# Patient Record
Sex: FEMALE | Race: WHITE | Employment: OTHER | URBAN - METROPOLITAN AREA
[De-identification: names, ages, dates, MRNs, and addresses within clinical notes are randomized per-mention and may not be internally consistent; named-entity substitution may affect disease eponyms.]

---

## 2017-01-09 ENCOUNTER — TELEPHONE (OUTPATIENT)
Dept: FAMILY MEDICINE CLINIC | Facility: CLINIC | Age: 67
End: 2017-01-09

## 2017-01-09 DIAGNOSIS — I10 ESSENTIAL HYPERTENSION: ICD-10-CM

## 2017-01-09 DIAGNOSIS — E78.2 MIXED HYPERLIPIDEMIA: ICD-10-CM

## 2017-01-09 DIAGNOSIS — E11.9 TYPE 2 DIABETES MELLITUS WITHOUT COMPLICATION, WITHOUT LONG-TERM CURRENT USE OF INSULIN (HCC): Primary | ICD-10-CM

## 2017-01-09 NOTE — TELEPHONE ENCOUNTER
Pt has appt for 1/26 need labs put in before appt to do a week before Lipid, Comp Met Panel, Micro and Hemoglobin A1C

## 2017-01-20 ENCOUNTER — APPOINTMENT (OUTPATIENT)
Dept: LAB | Facility: HOSPITAL | Age: 67
End: 2017-01-20
Attending: FAMILY MEDICINE
Payer: COMMERCIAL

## 2017-01-20 DIAGNOSIS — E78.2 MIXED HYPERLIPIDEMIA: ICD-10-CM

## 2017-01-20 DIAGNOSIS — E11.9 TYPE 2 DIABETES MELLITUS WITHOUT COMPLICATION, WITHOUT LONG-TERM CURRENT USE OF INSULIN (HCC): ICD-10-CM

## 2017-01-20 LAB
ALBUMIN SERPL-MCNC: 3.9 G/DL (ref 3.5–4.8)
ALP LIVER SERPL-CCNC: 93 U/L (ref 50–130)
ALT SERPL-CCNC: 28 U/L (ref 14–54)
AST SERPL-CCNC: 27 U/L (ref 15–41)
BILIRUB SERPL-MCNC: 0.3 MG/DL (ref 0.1–2)
BUN BLD-MCNC: 18 MG/DL (ref 8–20)
CALCIUM BLD-MCNC: 8.6 MG/DL (ref 8.3–10.3)
CHLORIDE: 104 MMOL/L (ref 101–111)
CHOLEST SMN-MCNC: 158 MG/DL (ref ?–200)
CO2: 28 MMOL/L (ref 22–32)
CREAT BLD-MCNC: 0.83 MG/DL (ref 0.55–1.02)
CREAT UR-SCNC: 54.6 MG/DL
EST. AVERAGE GLUCOSE BLD GHB EST-MCNC: 157 MG/DL (ref 68–126)
GLUCOSE BLD-MCNC: 121 MG/DL (ref 70–99)
HBA1C MFR BLD HPLC: 7.1 % (ref ?–5.7)
HDLC SERPL-MCNC: 43 MG/DL (ref 45–?)
HDLC SERPL: 3.67 {RATIO} (ref ?–4.44)
LDLC SERPL CALC-MCNC: 85 MG/DL (ref ?–130)
M PROTEIN MFR SERPL ELPH: 6.7 G/DL (ref 6.1–8.3)
MICROALBUMIN UR-MCNC: 0.72 MG/DL
MICROALBUMIN/CREAT 24H UR-RTO: 13.2 UG/MG (ref ?–30)
NONHDLC SERPL-MCNC: 115 MG/DL (ref ?–130)
POTASSIUM SERPL-SCNC: 4.1 MMOL/L (ref 3.6–5.1)
SODIUM SERPL-SCNC: 140 MMOL/L (ref 136–144)
TRIGLYCERIDES: 148 MG/DL (ref ?–150)
VLDL: 30 MG/DL (ref 5–40)

## 2017-01-20 PROCEDURE — 80061 LIPID PANEL: CPT

## 2017-01-20 PROCEDURE — 82043 UR ALBUMIN QUANTITATIVE: CPT

## 2017-01-20 PROCEDURE — 80053 COMPREHEN METABOLIC PANEL: CPT

## 2017-01-20 PROCEDURE — 36415 COLL VENOUS BLD VENIPUNCTURE: CPT

## 2017-01-20 PROCEDURE — 82570 ASSAY OF URINE CREATININE: CPT

## 2017-01-20 PROCEDURE — 83036 HEMOGLOBIN GLYCOSYLATED A1C: CPT

## 2017-01-26 ENCOUNTER — OFFICE VISIT (OUTPATIENT)
Dept: FAMILY MEDICINE CLINIC | Facility: CLINIC | Age: 67
End: 2017-01-26

## 2017-01-26 VITALS
RESPIRATION RATE: 16 BRPM | DIASTOLIC BLOOD PRESSURE: 78 MMHG | HEART RATE: 72 BPM | TEMPERATURE: 98 F | WEIGHT: 171 LBS | SYSTOLIC BLOOD PRESSURE: 130 MMHG | BODY MASS INDEX: 28 KG/M2

## 2017-01-26 DIAGNOSIS — E11.9 CONTROLLED TYPE 2 DIABETES MELLITUS WITHOUT COMPLICATION, WITHOUT LONG-TERM CURRENT USE OF INSULIN (HCC): Primary | ICD-10-CM

## 2017-01-26 DIAGNOSIS — E78.2 MIXED HYPERLIPIDEMIA: ICD-10-CM

## 2017-01-26 PROCEDURE — 99214 OFFICE O/P EST MOD 30 MIN: CPT | Performed by: FAMILY MEDICINE

## 2017-01-26 RX ORDER — METFORMIN HYDROCHLORIDE 500 MG/1
TABLET, EXTENDED RELEASE ORAL
Qty: 270 TABLET | Refills: 1 | Status: SHIPPED | OUTPATIENT
Start: 2017-01-26 | End: 2017-03-20

## 2017-01-26 RX ORDER — LISINOPRIL 5 MG/1
5 TABLET ORAL DAILY
Qty: 90 TABLET | Refills: 1 | Status: SHIPPED | OUTPATIENT
Start: 2017-01-26 | End: 2017-07-10

## 2017-01-26 NOTE — PROGRESS NOTES
Eileen Hurst is a 61year old female. HPI:   Patient presents for a medication check for her diabetes, hypertension and mixed hyperlipidemia. Patient reports she is taking her medications as ordered, denies any side effects.   Patient has been checkin well otherwise  SKIN: denies any unusual skin lesions or rashes  RESPIRATORY: denies shortness of breath with exertion  CARDIOVASCULAR: denies chest pain on exertion  GI: denies abdominal pain and denies heartburn  NEURO: denies headaches  EXTREMITIES: den apply Misc; 1 lancet by Finger stick route 2 (two) times daily. Use as directed. Dispense: 1 Box; Refill: 3  - CMP in 6 months; Future  - Hemoglobin A1C in 6 months; Future  - Microalb/Creat Ratio, in 6 months; Future    2.  Mixed hyperlipidemia  Continue

## 2017-03-20 RX ORDER — METFORMIN HYDROCHLORIDE 500 MG/1
TABLET, EXTENDED RELEASE ORAL
Qty: 270 TABLET | Refills: 0 | Status: SHIPPED | OUTPATIENT
Start: 2017-03-20 | End: 2017-07-10

## 2017-07-03 ENCOUNTER — APPOINTMENT (OUTPATIENT)
Dept: LAB | Facility: HOSPITAL | Age: 67
End: 2017-07-03
Attending: FAMILY MEDICINE
Payer: COMMERCIAL

## 2017-07-03 ENCOUNTER — PRIOR ORIGINAL RECORDS (OUTPATIENT)
Dept: OTHER | Age: 67
End: 2017-07-03

## 2017-07-03 DIAGNOSIS — E11.9 CONTROLLED TYPE 2 DIABETES MELLITUS WITHOUT COMPLICATION, WITHOUT LONG-TERM CURRENT USE OF INSULIN (HCC): ICD-10-CM

## 2017-07-03 DIAGNOSIS — E78.2 MIXED HYPERLIPIDEMIA: ICD-10-CM

## 2017-07-03 LAB
ALBUMIN SERPL-MCNC: 3.7 G/DL (ref 3.5–4.8)
ALP LIVER SERPL-CCNC: 93 U/L (ref 50–130)
ALT SERPL-CCNC: 24 U/L (ref 14–54)
AST SERPL-CCNC: 19 U/L (ref 15–41)
BILIRUB SERPL-MCNC: 0.3 MG/DL (ref 0.1–2)
BUN BLD-MCNC: 21 MG/DL (ref 8–20)
CALCIUM BLD-MCNC: 8.7 MG/DL (ref 8.3–10.3)
CHLORIDE: 105 MMOL/L (ref 101–111)
CHOLEST SMN-MCNC: 174 MG/DL (ref ?–200)
CO2: 29 MMOL/L (ref 22–32)
CREAT BLD-MCNC: 0.86 MG/DL (ref 0.55–1.02)
CREAT UR-SCNC: 70.5 MG/DL
EST. AVERAGE GLUCOSE BLD GHB EST-MCNC: 148 MG/DL (ref 68–126)
GLUCOSE BLD-MCNC: 134 MG/DL (ref 70–99)
HBA1C MFR BLD HPLC: 6.8 % (ref ?–5.7)
HDLC SERPL-MCNC: 38 MG/DL (ref 45–?)
HDLC SERPL: 4.58 {RATIO} (ref ?–4.44)
LDLC SERPL CALC-MCNC: 92 MG/DL (ref ?–130)
M PROTEIN MFR SERPL ELPH: 7.1 G/DL (ref 6.1–8.3)
MICROALBUMIN UR-MCNC: 1.05 MG/DL
MICROALBUMIN/CREAT 24H UR-RTO: 14.9 UG/MG (ref ?–30)
NONHDLC SERPL-MCNC: 136 MG/DL (ref ?–130)
POTASSIUM SERPL-SCNC: 4.7 MMOL/L (ref 3.6–5.1)
SODIUM SERPL-SCNC: 138 MMOL/L (ref 136–144)
TRIGLYCERIDES: 222 MG/DL (ref ?–150)
VLDL: 44 MG/DL (ref 5–40)

## 2017-07-03 PROCEDURE — 82043 UR ALBUMIN QUANTITATIVE: CPT

## 2017-07-03 PROCEDURE — 82570 ASSAY OF URINE CREATININE: CPT

## 2017-07-03 PROCEDURE — 83036 HEMOGLOBIN GLYCOSYLATED A1C: CPT

## 2017-07-03 PROCEDURE — 36415 COLL VENOUS BLD VENIPUNCTURE: CPT

## 2017-07-03 PROCEDURE — 80053 COMPREHEN METABOLIC PANEL: CPT

## 2017-07-03 PROCEDURE — 80061 LIPID PANEL: CPT

## 2017-07-10 ENCOUNTER — OFFICE VISIT (OUTPATIENT)
Dept: FAMILY MEDICINE CLINIC | Facility: CLINIC | Age: 67
End: 2017-07-10

## 2017-07-10 VITALS
HEART RATE: 76 BPM | RESPIRATION RATE: 16 BRPM | OXYGEN SATURATION: 99 % | TEMPERATURE: 98 F | DIASTOLIC BLOOD PRESSURE: 70 MMHG | WEIGHT: 166 LBS | BODY MASS INDEX: 28 KG/M2 | SYSTOLIC BLOOD PRESSURE: 136 MMHG

## 2017-07-10 DIAGNOSIS — R01.1 HEART MURMUR: Primary | ICD-10-CM

## 2017-07-10 DIAGNOSIS — E11.9 CONTROLLED TYPE 2 DIABETES MELLITUS WITHOUT COMPLICATION, WITHOUT LONG-TERM CURRENT USE OF INSULIN (HCC): ICD-10-CM

## 2017-07-10 DIAGNOSIS — E78.2 MIXED HYPERLIPIDEMIA: ICD-10-CM

## 2017-07-10 PROCEDURE — 99214 OFFICE O/P EST MOD 30 MIN: CPT | Performed by: FAMILY MEDICINE

## 2017-07-10 RX ORDER — LISINOPRIL 5 MG/1
5 TABLET ORAL DAILY
Qty: 90 TABLET | Refills: 1 | Status: SHIPPED | OUTPATIENT
Start: 2017-07-10 | End: 2017-12-15

## 2017-07-10 RX ORDER — CRANBERRY FRUIT EXTRACT 250 MG
CAPSULE ORAL
COMMUNITY

## 2017-07-10 RX ORDER — METFORMIN HYDROCHLORIDE 500 MG/1
TABLET, EXTENDED RELEASE ORAL
Qty: 270 TABLET | Refills: 0 | Status: SHIPPED | OUTPATIENT
Start: 2017-07-10 | End: 2017-10-16

## 2017-07-10 NOTE — PROGRESS NOTES
Saad Johnson is a 61year old female. HPI:   Patient presents for a medication check for her diabetes, hypertension and mixed hyperlipidemia. Patient reports she is taking her medications as ordered, denies any side effects.   Patient has been checkin Smokeless tobacco: Never Used                      Alcohol use: Yes           0.5 oz/week     Standard drinks or equivalent: 1 per week     Comment: 1 glass wine/wk       REVIEW OF SYSTEMS:   GENERAL HEALTH: feels well o TABLETS BY MOUTH EVERY MORNING AND 1 TABLETS BY MOUTH EVERY EVENING      lisinopril 5 MG Oral Tab 90 tablet 1      Sig: Take 1 tablet (5 mg total) by mouth daily.       Glucose Blood (OLEG CONTOUR NEXT TEST) In Vitro Strip 200 strip 3      Sig: TEST THREE

## 2017-08-10 ENCOUNTER — HOSPITAL ENCOUNTER (OUTPATIENT)
Dept: CV DIAGNOSTICS | Facility: HOSPITAL | Age: 67
Discharge: HOME | End: 2017-08-10
Attending: FAMILY MEDICINE
Payer: MEDICARE

## 2017-08-10 DIAGNOSIS — R01.1 HEART MURMUR: ICD-10-CM

## 2017-08-10 PROCEDURE — 93306 TTE W/DOPPLER COMPLETE: CPT | Performed by: FAMILY MEDICINE

## 2017-08-14 ENCOUNTER — MYAURORA ACCOUNT LINK (OUTPATIENT)
Dept: OTHER | Age: 67
End: 2017-08-14

## 2017-08-14 ENCOUNTER — PRIOR ORIGINAL RECORDS (OUTPATIENT)
Dept: OTHER | Age: 67
End: 2017-08-14

## 2017-08-21 LAB
ALBUMIN: 3.7 G/DL
ALKALINE PHOSPHATATE(ALK PHOS): 93 IU/L
BILIRUBIN TOTAL: 0.3 MG/DL
BUN: 21 MG/DL
CALCIUM: 8.7 MG/DL
CHLORIDE: 105 MEQ/L
CHOLESTEROL, TOTAL: 174 MG/DL
CREATININE, SERUM: 0.86 MG/DL
GLUCOSE: 134 MG/DL
HDL CHOLESTEROL: 38 MG/DL
HEMOGLOBIN A1C: 6.8 %
LDL CHOLESTEROL: 92 MG/DL
POTASSIUM, SERUM: 4.7 MEQ/L
PROTEIN, TOTAL: 7.1 G/DL
SGOT (AST): 19 IU/L
SGPT (ALT): 24 IU/L
SODIUM: 138 MEQ/L
TRIGLYCERIDES: 222 MG/DL

## 2017-08-23 ENCOUNTER — OFFICE VISIT (OUTPATIENT)
Dept: FAMILY MEDICINE CLINIC | Facility: CLINIC | Age: 67
End: 2017-08-23

## 2017-08-23 VITALS
OXYGEN SATURATION: 99 % | TEMPERATURE: 99 F | SYSTOLIC BLOOD PRESSURE: 130 MMHG | BODY MASS INDEX: 27.49 KG/M2 | DIASTOLIC BLOOD PRESSURE: 66 MMHG | HEART RATE: 86 BPM | WEIGHT: 165 LBS | RESPIRATION RATE: 16 BRPM | HEIGHT: 65 IN

## 2017-08-23 DIAGNOSIS — R39.9 UTI SYMPTOMS: Primary | ICD-10-CM

## 2017-08-23 LAB
GLUCOSE (URINE DIPSTICK): 100 MG/DL
MULTISTIX LOT#: ABNORMAL NUMERIC
PH, URINE: 7 (ref 4.5–8)
PROTEIN (URINE DIPSTICK): 300 MG/DL
SPECIFIC GRAVITY: 1.02 (ref 1–1.03)
UROBILINOGEN,SEMI-QN: 2 MG/DL (ref 0–1.9)

## 2017-08-23 PROCEDURE — 81003 URINALYSIS AUTO W/O SCOPE: CPT | Performed by: NURSE PRACTITIONER

## 2017-08-23 PROCEDURE — 99213 OFFICE O/P EST LOW 20 MIN: CPT | Performed by: NURSE PRACTITIONER

## 2017-08-23 PROCEDURE — 87086 URINE CULTURE/COLONY COUNT: CPT | Performed by: NURSE PRACTITIONER

## 2017-08-23 PROCEDURE — 87186 SC STD MICRODIL/AGAR DIL: CPT | Performed by: NURSE PRACTITIONER

## 2017-08-23 PROCEDURE — 87088 URINE BACTERIA CULTURE: CPT | Performed by: NURSE PRACTITIONER

## 2017-08-23 RX ORDER — PHENAZOPYRIDINE HYDROCHLORIDE 200 MG/1
200 TABLET, FILM COATED ORAL 3 TIMES DAILY PRN
Qty: 9 TABLET | Refills: 0 | Status: SHIPPED | OUTPATIENT
Start: 2017-08-23 | End: 2018-08-22 | Stop reason: ALTCHOICE

## 2017-08-23 RX ORDER — SULFAMETHOXAZOLE AND TRIMETHOPRIM 800; 160 MG/1; MG/1
1 TABLET ORAL 2 TIMES DAILY
Qty: 14 TABLET | Refills: 0 | Status: SHIPPED | OUTPATIENT
Start: 2017-08-23 | End: 2017-08-30

## 2017-08-23 NOTE — PROGRESS NOTES
Tang King is a 74year old female. HPI:   Patient presents with symptoms of UTI for 1-2 days. Complaining of urinary frequency, urgency, dysuria,  Denies back pain, fever, hematuria.   Pt has strong history of UTI in past.  Pt denies any new sexua GENERAL HEALTH: no  fever/chills or fatigue  SKIN: denies any unusual skin lesions or rashes  RESPIRATORY: no shortness of breath with exertion  CARDIOVASCULAR: denies chest pain on exertion and palpitations. GI: no nausea or vomiting  : as above.   NEUR -     Phenazopyridine HCl 200 MG Oral Tab; Take 1 tablet (200 mg total) by mouth 3 (three) times daily as needed for Pain. Patient Instructions     Bladder Infection, Female (Adult)    Urine is normally doesn't have any bacteria in it.  But bacteria The most common cause of bladder infections is bacteria from the bowels. The bacteria get onto the skin around the opening of the urethra. From there, they can get into the urine and travel up to the bladder, causing inflammation and infection.  This usuall · Urinate more often. Don't try to hold urine in for a long time. · Wear loose-fitting clothes and cotton underwear. Avoid tight-fitting pants. · Improve your diet and prevent constipation.  Eat more fresh fruit and vegetables, and fiber, and less junk an

## 2017-08-25 NOTE — PROGRESS NOTES
Called pt to notify of lab results. Detailed msg left per pt consent.  Pt to call back or follow-up if symptoms increase or persist.

## 2017-10-10 ENCOUNTER — RESEARCH ENCOUNTER (OUTPATIENT)
Dept: HEMATOLOGY/ONCOLOGY | Facility: HOSPITAL | Age: 67
End: 2017-10-10

## 2017-10-10 NOTE — PROGRESS NOTES
LVM for patient to return phone call for continued clinical trial follow up. Research RN contact information provided.

## 2017-10-16 DIAGNOSIS — E11.9 CONTROLLED TYPE 2 DIABETES MELLITUS WITHOUT COMPLICATION, WITHOUT LONG-TERM CURRENT USE OF INSULIN (HCC): ICD-10-CM

## 2017-10-16 RX ORDER — METFORMIN HYDROCHLORIDE 500 MG/1
TABLET, EXTENDED RELEASE ORAL
Qty: 270 TABLET | Refills: 0 | Status: SHIPPED | OUTPATIENT
Start: 2017-10-16 | End: 2017-12-15

## 2017-11-02 ENCOUNTER — RESEARCH ENCOUNTER (OUTPATIENT)
Dept: HEMATOLOGY/ONCOLOGY | Facility: HOSPITAL | Age: 67
End: 2017-11-02

## 2017-11-02 NOTE — PROGRESS NOTES
Patient was sent certified letter on [id number]/84/4198 regarding continued enrollment in clinic trial.    Received notification, dated 10/25/2017 by patient that she would like to withdrawal her consent for study participation.  Patient will be withdrawn from stud

## 2017-11-06 ENCOUNTER — TELEPHONE (OUTPATIENT)
Dept: FAMILY MEDICINE CLINIC | Facility: CLINIC | Age: 67
End: 2017-11-06

## 2017-11-06 DIAGNOSIS — E11.9 TYPE 2 DIABETES MELLITUS WITHOUT COMPLICATION, WITHOUT LONG-TERM CURRENT USE OF INSULIN (HCC): Primary | ICD-10-CM

## 2017-11-06 NOTE — TELEPHONE ENCOUNTER
Pt called she made lab appt for blood draw but this was missing MICROALB/CREAT RATIO, RANDOM URINE  she states she gets this done every 6 months her appt is for Dec please add

## 2017-12-08 ENCOUNTER — APPOINTMENT (OUTPATIENT)
Dept: LAB | Facility: HOSPITAL | Age: 67
End: 2017-12-08
Attending: FAMILY MEDICINE
Payer: MEDICARE

## 2017-12-08 DIAGNOSIS — E11.9 TYPE 2 DIABETES MELLITUS WITHOUT COMPLICATION, WITHOUT LONG-TERM CURRENT USE OF INSULIN (HCC): ICD-10-CM

## 2017-12-08 DIAGNOSIS — E78.2 MIXED HYPERLIPIDEMIA: ICD-10-CM

## 2017-12-08 DIAGNOSIS — E11.9 CONTROLLED TYPE 2 DIABETES MELLITUS WITHOUT COMPLICATION, WITHOUT LONG-TERM CURRENT USE OF INSULIN (HCC): ICD-10-CM

## 2017-12-08 PROCEDURE — 82570 ASSAY OF URINE CREATININE: CPT

## 2017-12-08 PROCEDURE — 80061 LIPID PANEL: CPT

## 2017-12-08 PROCEDURE — 82043 UR ALBUMIN QUANTITATIVE: CPT

## 2017-12-08 PROCEDURE — 80053 COMPREHEN METABOLIC PANEL: CPT

## 2017-12-08 PROCEDURE — 83036 HEMOGLOBIN GLYCOSYLATED A1C: CPT

## 2017-12-08 PROCEDURE — 36415 COLL VENOUS BLD VENIPUNCTURE: CPT

## 2017-12-15 ENCOUNTER — OFFICE VISIT (OUTPATIENT)
Dept: FAMILY MEDICINE CLINIC | Facility: CLINIC | Age: 67
End: 2017-12-15

## 2017-12-15 VITALS
DIASTOLIC BLOOD PRESSURE: 80 MMHG | TEMPERATURE: 98 F | SYSTOLIC BLOOD PRESSURE: 142 MMHG | HEART RATE: 72 BPM | WEIGHT: 164 LBS | RESPIRATION RATE: 16 BRPM | HEIGHT: 65 IN | BODY MASS INDEX: 27.32 KG/M2

## 2017-12-15 DIAGNOSIS — I10 ESSENTIAL HYPERTENSION: Primary | ICD-10-CM

## 2017-12-15 DIAGNOSIS — E11.9 CONTROLLED TYPE 2 DIABETES MELLITUS WITHOUT COMPLICATION, WITHOUT LONG-TERM CURRENT USE OF INSULIN (HCC): ICD-10-CM

## 2017-12-15 DIAGNOSIS — E78.2 MIXED HYPERLIPIDEMIA: ICD-10-CM

## 2017-12-15 PROCEDURE — 99214 OFFICE O/P EST MOD 30 MIN: CPT | Performed by: FAMILY MEDICINE

## 2017-12-15 NOTE — PROGRESS NOTES
Tanika Erickson is a 74year old female. HPI:   Patient presents for a medication check for her diabetes, hypertension and mixed hyperlipidemia. Patient reports she is taking her medications as ordered, denies any side effects.   Patient has been taking mammogram 7/31/12    normal   • Leukocytoclastic vasculitis ([facility] [address]       Social History:  Smoking status: Never Smoker                                                              Smokeless tobacco: Never Used                      Alcohol use:  Yes HCl  MG Oral Tablet 24 Hr 270 tablet 1      Sig: TAKE 2 TABLETS BY MOUTH EVERY MORNING AND 1 TABLET EVERY EVENING      lisinopril 5 MG Oral Tab 90 tablet 1      Sig: Take 1 tablet (5 mg total) by mouth daily.            Imaging & Consults:  None    Fo

## 2017-12-19 ENCOUNTER — TELEPHONE (OUTPATIENT)
Dept: FAMILY MEDICINE CLINIC | Facility: CLINIC | Age: 67
End: 2017-12-19

## 2017-12-19 DIAGNOSIS — E11.00 TYPE 2 DIABETES MELLITUS WITH HYPEROSMOLARITY WITHOUT COMA, WITHOUT LONG-TERM CURRENT USE OF INSULIN (HCC): Primary | ICD-10-CM

## 2017-12-19 RX ORDER — METFORMIN HYDROCHLORIDE 500 MG/1
TABLET, EXTENDED RELEASE ORAL
Qty: 270 TABLET | Refills: 1 | Status: SHIPPED | OUTPATIENT
Start: 2017-12-19 | End: 2018-06-08

## 2017-12-19 RX ORDER — LISINOPRIL 5 MG/1
5 TABLET ORAL DAILY
Qty: 90 TABLET | Refills: 1 | Status: SHIPPED | OUTPATIENT
Start: 2017-12-19 | End: 2018-06-08

## 2018-03-16 ENCOUNTER — OFFICE VISIT (OUTPATIENT)
Dept: FAMILY MEDICINE CLINIC | Facility: CLINIC | Age: 68
End: 2018-03-16

## 2018-03-16 VITALS
BODY MASS INDEX: 28.49 KG/M2 | TEMPERATURE: 98 F | WEIGHT: 171 LBS | HEIGHT: 65 IN | RESPIRATION RATE: 12 BRPM | HEART RATE: 80 BPM | DIASTOLIC BLOOD PRESSURE: 74 MMHG | SYSTOLIC BLOOD PRESSURE: 134 MMHG

## 2018-03-16 DIAGNOSIS — Z13.89 SCREENING FOR GENITOURINARY CONDITION: ICD-10-CM

## 2018-03-16 DIAGNOSIS — C50.912 MALIGNANT NEOPLASM OF LEFT FEMALE BREAST, UNSPECIFIED ESTROGEN RECEPTOR STATUS, UNSPECIFIED SITE OF BREAST (HCC): ICD-10-CM

## 2018-03-16 DIAGNOSIS — Z23 NEED FOR VACCINATION: ICD-10-CM

## 2018-03-16 DIAGNOSIS — Z12.4 ENCOUNTER FOR SCREENING FOR CERVICAL CANCER: Primary | ICD-10-CM

## 2018-03-16 DIAGNOSIS — E11.9 CONTROLLED TYPE 2 DIABETES MELLITUS WITHOUT COMPLICATION, WITHOUT LONG-TERM CURRENT USE OF INSULIN (HCC): ICD-10-CM

## 2018-03-16 DIAGNOSIS — Z00.00 ENCOUNTER FOR ANNUAL HEALTH EXAMINATION: ICD-10-CM

## 2018-03-16 DIAGNOSIS — L20.84 INTRINSIC ECZEMA: ICD-10-CM

## 2018-03-16 LAB
APPEARANCE: CLEAR
MULTISTIX LOT#: NORMAL NUMERIC
PH, URINE: 5.5 (ref 4.5–8)
SPECIFIC GRAVITY: 1 (ref 1–1.03)
URINE-COLOR: YELLOW
UROBILINOGEN,SEMI-QN: 0.2 MG/DL (ref 0–1.9)

## 2018-03-16 PROCEDURE — 88175 CYTOPATH C/V AUTO FLUID REDO: CPT | Performed by: FAMILY MEDICINE

## 2018-03-16 PROCEDURE — G0402 INITIAL PREVENTIVE EXAM: HCPCS | Performed by: FAMILY MEDICINE

## 2018-03-16 PROCEDURE — 90670 PCV13 VACCINE IM: CPT | Performed by: FAMILY MEDICINE

## 2018-03-16 PROCEDURE — 96160 PT-FOCUSED HLTH RISK ASSMT: CPT | Performed by: FAMILY MEDICINE

## 2018-03-16 PROCEDURE — G0009 ADMIN PNEUMOCOCCAL VACCINE: HCPCS | Performed by: FAMILY MEDICINE

## 2018-03-16 PROCEDURE — 81003 URINALYSIS AUTO W/O SCOPE: CPT | Performed by: FAMILY MEDICINE

## 2018-03-16 RX ORDER — CLOBETASOL PROPIONATE 0.46 MG/ML
SOLUTION TOPICAL
Qty: 50 ML | Refills: 1 | Status: SHIPPED | OUTPATIENT
Start: 2018-03-16 | End: 2018-03-16

## 2018-03-16 RX ORDER — CLOBETASOL PROPIONATE 0.46 MG/ML
SOLUTION TOPICAL
Qty: 50 ML | Refills: 1 | Status: SHIPPED | OUTPATIENT
Start: 2018-03-16

## 2018-03-16 NOTE — PROGRESS NOTES
HPI:   Corrinne Farrier is a 74year old female who presents for a MA (Medicare Advantage) Supervisit (Once per calendar year). Has been well. No new complaints  Hx of lest breast cancer with one node involvement. Dx'd in 2002. Walks for exercise. hypertension     Intrinsic eczema    Wt Readings from Last 3 Encounters:  03/16/18 : 171 lb  12/15/17 : 164 lb  08/23/17 : 165 lb     Last Cholesterol Labs:     Lab Results  Component Value Date   CHOLEST 179 12/08/2017   HDL 49 12/08/2017   LDL 94 12/08/2 includes Breast Cancer (age of onset: 50) in her self; Cancer (age of onset: 46) in her father; Ovarian Cancer (age of onset: 55) in her sister. SOCIAL HISTORY:   She  reports that she has never smoked.  She has never used smokeless tobacco. She reports t normal, septum midline,mucosa normal, no drainage or sinus tenderness   Throat: Lips, mucosa, and tongue normal; teeth and gums normal   Neck: Supple, symmetrical, trachea midline, no adenopathy;  thyroid: not enlarged, symmetric, no tenderness/mass/nodule AFFECTED AREA(S) NIGHTLY    Screening for genitourinary condition  -     URINALYSIS, AUTO, W/O SCOPE       Diet assessment: good     PLAN:  The patient indicates understanding of these issues and agrees to the plan.   Reinforced healthy diet, lifestyle, and 67 Data entered on: 12/4/2017   Last Dilated Eye Exam 12/4/2017       Bone Density Screening      Dexascan Every two years Last Dexa Scan:   XR DEXA BONE DENSITOMETRY (CPT=77080) 09/12/2014    No flowsheet data found.     Pap and Pelvic      Pap: Every 3 yr (mmol/L)   Date Value   12/08/2017 4.1     POTASSIUM (mmol/L)   Date Value   12/23/2015 4.5    No flowsheet data found.     Creatinine  Annually CREATININE (mg/dL)   Date Value   12/23/2015 0.92     Creatinine (mg/dL)   Date Value   12/08/2017 0.86    No fl

## 2018-03-16 NOTE — PATIENT INSTRUCTIONS
Rosario Robb's SCREENING SCHEDULE   Tests on this list are recommended by your physician but may not be covered, or covered at this frequency, by your insurer. Please check with your insurance carrier before scheduling to verify coverage.    JOSEF IPPE only No results found for this or any previous visit.  Limited to patients who meet one of the following criteria:   • Men who are 73-68 years old and have smoked more than 100 cigarettes in their lifetime   • Anyone with a family history    Colorectal 74, and as needed after 75 Mammogram,1 Yr due on 10/08/2017 Please get this Mammogram regularly   Immunizations      Influenza  Covered Annually No orders found for this or any previous visit.  Please get every year    Pneumococcal 13 (Prevnar)  Covered Onc home computer and printer. (the forms are also available in [place]  [email or web address]  This link also has information from the [facility] [place] 2[facility] regarding Advance Directives.     Rosario Robb's SCREENING SCHEDULE   Tests on this list medical reasons Electrocardiogram date Routine EKG is not a screening covered service except at the [place] to Medicare Visit    Abdominal aortic aneurysm screening (once between ages 73-68) IPPE only No results found for this or any previous visit.  Limite display for this patient. Chlamydia  Annually if high risk No results found for: CHLAMYDIA No flowsheet data found.     Screening Mammogram      Mammogram    Recommend Annually to at least age 76, and as needed after 76 Mammogram,1 Yr due on 10/08/2017 has a lot of good information including definitions of the different types of Advance Directives.  It also has the State forms available on it's website for anyone to review and print using their home computer and printer. (the forms are also available in [place]

## 2018-05-25 ENCOUNTER — HOSPITAL ENCOUNTER (OUTPATIENT)
Dept: MAMMOGRAPHY | Facility: HOSPITAL | Age: 68
Discharge: HOME | End: 2018-05-25
Attending: FAMILY MEDICINE
Payer: MEDICARE

## 2018-05-25 DIAGNOSIS — C50.912 MALIGNANT NEOPLASM OF LEFT FEMALE BREAST, UNSPECIFIED ESTROGEN RECEPTOR STATUS, UNSPECIFIED SITE OF BREAST (HCC): ICD-10-CM

## 2018-05-25 PROCEDURE — 77062 BREAST TOMOSYNTHESIS BI: CPT | Performed by: FAMILY MEDICINE

## 2018-05-25 PROCEDURE — 77066 DX MAMMO INCL CAD BI: CPT | Performed by: FAMILY MEDICINE

## 2018-06-01 ENCOUNTER — APPOINTMENT (OUTPATIENT)
Dept: LAB | Facility: HOSPITAL | Age: 68
End: 2018-06-01
Attending: FAMILY MEDICINE
Payer: MEDICARE

## 2018-06-01 DIAGNOSIS — E78.2 MIXED HYPERLIPIDEMIA: ICD-10-CM

## 2018-06-01 DIAGNOSIS — E11.00 TYPE 2 DIABETES MELLITUS WITH HYPEROSMOLARITY WITHOUT COMA, WITHOUT LONG-TERM CURRENT USE OF INSULIN (HCC): ICD-10-CM

## 2018-06-01 DIAGNOSIS — E11.9 CONTROLLED TYPE 2 DIABETES MELLITUS WITHOUT COMPLICATION, WITHOUT LONG-TERM CURRENT USE OF INSULIN (HCC): ICD-10-CM

## 2018-06-01 DIAGNOSIS — I10 ESSENTIAL HYPERTENSION: ICD-10-CM

## 2018-06-01 PROCEDURE — 82043 UR ALBUMIN QUANTITATIVE: CPT

## 2018-06-01 PROCEDURE — 82570 ASSAY OF URINE CREATININE: CPT

## 2018-06-01 PROCEDURE — 36415 COLL VENOUS BLD VENIPUNCTURE: CPT

## 2018-06-01 PROCEDURE — 83036 HEMOGLOBIN GLYCOSYLATED A1C: CPT

## 2018-06-01 PROCEDURE — 80061 LIPID PANEL: CPT

## 2018-06-01 PROCEDURE — 80053 COMPREHEN METABOLIC PANEL: CPT

## 2018-06-08 ENCOUNTER — OFFICE VISIT (OUTPATIENT)
Dept: FAMILY MEDICINE CLINIC | Facility: CLINIC | Age: 68
End: 2018-06-08

## 2018-06-08 VITALS
TEMPERATURE: 98 F | DIASTOLIC BLOOD PRESSURE: 80 MMHG | WEIGHT: 169 LBS | HEART RATE: 72 BPM | BODY MASS INDEX: 27.16 KG/M2 | SYSTOLIC BLOOD PRESSURE: 130 MMHG | RESPIRATION RATE: 16 BRPM | HEIGHT: 66.25 IN

## 2018-06-08 DIAGNOSIS — E11.9 CONTROLLED TYPE 2 DIABETES MELLITUS WITHOUT COMPLICATION, WITHOUT LONG-TERM CURRENT USE OF INSULIN (HCC): Primary | ICD-10-CM

## 2018-06-08 DIAGNOSIS — E78.2 MIXED HYPERLIPIDEMIA: ICD-10-CM

## 2018-06-08 DIAGNOSIS — J20.9 BRONCHITIS WITH BRONCHOSPASM: ICD-10-CM

## 2018-06-08 DIAGNOSIS — I10 ESSENTIAL HYPERTENSION: ICD-10-CM

## 2018-06-08 PROCEDURE — 99214 OFFICE O/P EST MOD 30 MIN: CPT | Performed by: FAMILY MEDICINE

## 2018-06-08 RX ORDER — METHYLPREDNISOLONE 4 MG/1
TABLET ORAL
Qty: 1 KIT | Refills: 0 | Status: SHIPPED | OUTPATIENT
Start: 2018-06-08 | End: 2018-08-22 | Stop reason: ALTCHOICE

## 2018-06-08 RX ORDER — ALBUTEROL SULFATE 90 UG/1
2 AEROSOL, METERED RESPIRATORY (INHALATION) EVERY 4 HOURS PRN
Qty: 1 INHALER | Refills: 2 | Status: SHIPPED | OUTPATIENT
Start: 2018-06-08 | End: 2018-12-07 | Stop reason: ALTCHOICE

## 2018-06-08 RX ORDER — LISINOPRIL 5 MG/1
5 TABLET ORAL DAILY
Qty: 90 TABLET | Refills: 1 | Status: SHIPPED | OUTPATIENT
Start: 2018-06-08 | End: 2018-11-29

## 2018-06-08 RX ORDER — METFORMIN HYDROCHLORIDE 500 MG/1
TABLET, EXTENDED RELEASE ORAL
Qty: 270 TABLET | Refills: 1 | Status: SHIPPED | OUTPATIENT
Start: 2018-06-08 | End: 2018-11-29

## 2018-06-08 NOTE — PROGRESS NOTES
Deborah Lynn is a 74year old female. HPI:   Patient presents for a medication check for her diabetes, hypertension and mixed hyperlipidemia. Patient reports she is taking her medications as ordered, denies any side effects.   Patient has been taking mouth 2 (two) times daily.  Disp:  Rfl:       Past Medical History:   Diagnosis Date   • Breast CA ([email or web address] 2002   • History of mammogram 7/31/12    normal   • Leukocytoclastic vasculitis ([email or web address]       Social History:  Smoking status: Never Smoker 1 tablet (5 mg total) by mouth daily. Dispense: 90 tablet; Refill: 1  - COMP METABOLIC PANEL (14); Future  - HEMOGLOBIN A1C; Future  - MICROALB/CREAT RATIO, RANDOM URINE; Future    2. Essential hypertension  - lisinopril 5 MG Oral Tab;  Take 1 tablet (5 mg

## 2018-08-22 ENCOUNTER — OFFICE VISIT (OUTPATIENT)
Dept: FAMILY MEDICINE CLINIC | Facility: CLINIC | Age: 68
End: 2018-08-22
Payer: MEDICARE

## 2018-08-22 VITALS
SYSTOLIC BLOOD PRESSURE: 132 MMHG | WEIGHT: 173 LBS | HEIGHT: 66.25 IN | BODY MASS INDEX: 27.8 KG/M2 | TEMPERATURE: 98 F | HEART RATE: 84 BPM | RESPIRATION RATE: 18 BRPM | DIASTOLIC BLOOD PRESSURE: 68 MMHG

## 2018-08-22 DIAGNOSIS — R39.9 UTI SYMPTOMS: Primary | ICD-10-CM

## 2018-08-22 LAB
MULTISTIX LOT#: ABNORMAL NUMERIC
NITRITE, URINE: POSITIVE
PH, URINE: 5 (ref 4.5–8)
SPECIFIC GRAVITY: 1.01 (ref 1–1.03)
UROBILINOGEN,SEMI-QN: 0.2 MG/DL (ref 0–1.9)

## 2018-08-22 PROCEDURE — 99213 OFFICE O/P EST LOW 20 MIN: CPT | Performed by: NURSE PRACTITIONER

## 2018-08-22 PROCEDURE — 87088 URINE BACTERIA CULTURE: CPT | Performed by: NURSE PRACTITIONER

## 2018-08-22 PROCEDURE — 87086 URINE CULTURE/COLONY COUNT: CPT | Performed by: NURSE PRACTITIONER

## 2018-08-22 PROCEDURE — 81003 URINALYSIS AUTO W/O SCOPE: CPT | Performed by: NURSE PRACTITIONER

## 2018-08-22 PROCEDURE — 87186 SC STD MICRODIL/AGAR DIL: CPT | Performed by: NURSE PRACTITIONER

## 2018-08-22 RX ORDER — SULFAMETHOXAZOLE AND TRIMETHOPRIM 800; 160 MG/1; MG/1
1 TABLET ORAL 2 TIMES DAILY
Qty: 14 TABLET | Refills: 0 | Status: SHIPPED | OUTPATIENT
Start: 2018-08-22 | End: 2018-08-29

## 2018-08-22 NOTE — PROGRESS NOTES
Lul Meadows is a 79year old female. HPI:   Patient presents today reporting urinary frequency and bladder spasms for the past 2 days. Patient denies any dysuria or hematuria.   Patient reports that she started taking Azo 2 days ago and her symptoms equivalent: 1 per week     Comment: 1 glass wine/wk       REVIEW OF SYSTEMS:   GENERAL HEALTH: feels well otherwise- denies any fever or chills.   RESPIRATORY: denies shortness of breath with exertion  CARDIOVASCULAR: denies chest pain on exertion  GI: jc Sulfamethoxazole-TMP -160 MG Oral Tab per tablet; Take 1 tablet by mouth 2 (two) times daily. Dispense: 14 tablet; Refill: 0  - URINE CULTURE, ROUTINE      The patient indicates understanding of these issues and agrees to the plan.   The patient is a

## 2018-08-24 ENCOUNTER — TELEPHONE (OUTPATIENT)
Dept: FAMILY MEDICINE CLINIC | Facility: CLINIC | Age: 68
End: 2018-08-24

## 2018-08-24 DIAGNOSIS — N39.0 URINARY TRACT INFECTION WITHOUT HEMATURIA, SITE UNSPECIFIED: Primary | ICD-10-CM

## 2018-09-04 ENCOUNTER — LAB ENCOUNTER (OUTPATIENT)
Dept: LAB | Age: 68
End: 2018-09-04
Attending: NURSE PRACTITIONER
Payer: MEDICARE

## 2018-09-04 DIAGNOSIS — N39.0 URINARY TRACT INFECTION WITHOUT HEMATURIA, SITE UNSPECIFIED: ICD-10-CM

## 2018-09-04 LAB
BILIRUB UR QL STRIP.AUTO: NEGATIVE
CLARITY UR REFRACT.AUTO: CLEAR
GLUCOSE UR STRIP.AUTO-MCNC: NEGATIVE MG/DL
KETONES UR STRIP.AUTO-MCNC: NEGATIVE MG/DL
NITRITE UR QL STRIP.AUTO: NEGATIVE
PH UR STRIP.AUTO: 5 [PH] (ref 4.5–8)
PROT UR STRIP.AUTO-MCNC: NEGATIVE MG/DL
RBC UR QL AUTO: NEGATIVE
SP GR UR STRIP.AUTO: 1.01 (ref 1–1.03)
UROBILINOGEN UR STRIP.AUTO-MCNC: <2 MG/DL

## 2018-09-04 PROCEDURE — 87086 URINE CULTURE/COLONY COUNT: CPT

## 2018-09-04 PROCEDURE — 81001 URINALYSIS AUTO W/SCOPE: CPT

## 2018-11-27 ENCOUNTER — APPOINTMENT (OUTPATIENT)
Dept: LAB | Facility: HOSPITAL | Age: 68
End: 2018-11-27
Attending: FAMILY MEDICINE
Payer: MEDICARE

## 2018-11-27 DIAGNOSIS — E11.9 CONTROLLED TYPE 2 DIABETES MELLITUS WITHOUT COMPLICATION, WITHOUT LONG-TERM CURRENT USE OF INSULIN (HCC): ICD-10-CM

## 2018-11-27 DIAGNOSIS — I10 ESSENTIAL HYPERTENSION: ICD-10-CM

## 2018-11-27 DIAGNOSIS — E78.2 MIXED HYPERLIPIDEMIA: ICD-10-CM

## 2018-11-27 PROCEDURE — 82570 ASSAY OF URINE CREATININE: CPT

## 2018-11-27 PROCEDURE — 83036 HEMOGLOBIN GLYCOSYLATED A1C: CPT

## 2018-11-27 PROCEDURE — 80061 LIPID PANEL: CPT

## 2018-11-27 PROCEDURE — 36415 COLL VENOUS BLD VENIPUNCTURE: CPT

## 2018-11-27 PROCEDURE — 82043 UR ALBUMIN QUANTITATIVE: CPT

## 2018-11-27 PROCEDURE — 80053 COMPREHEN METABOLIC PANEL: CPT

## 2018-11-29 DIAGNOSIS — E11.9 CONTROLLED TYPE 2 DIABETES MELLITUS WITHOUT COMPLICATION, WITHOUT LONG-TERM CURRENT USE OF INSULIN (HCC): ICD-10-CM

## 2018-11-29 DIAGNOSIS — I10 ESSENTIAL HYPERTENSION: ICD-10-CM

## 2018-11-29 RX ORDER — METFORMIN HYDROCHLORIDE 500 MG/1
TABLET, EXTENDED RELEASE ORAL
Qty: 270 TABLET | Refills: 0 | Status: SHIPPED | OUTPATIENT
Start: 2018-11-29 | End: 2019-02-15

## 2018-11-29 RX ORDER — LISINOPRIL 5 MG/1
TABLET ORAL
Qty: 90 TABLET | Refills: 0 | Status: SHIPPED | OUTPATIENT
Start: 2018-11-29 | End: 2019-02-15

## 2018-12-07 ENCOUNTER — TELEPHONE (OUTPATIENT)
Dept: FAMILY MEDICINE CLINIC | Facility: CLINIC | Age: 68
End: 2018-12-07

## 2018-12-07 PROCEDURE — 81001 URINALYSIS AUTO W/SCOPE: CPT | Performed by: UROLOGY

## 2018-12-07 PROCEDURE — 87186 SC STD MICRODIL/AGAR DIL: CPT | Performed by: UROLOGY

## 2018-12-07 PROCEDURE — 87086 URINE CULTURE/COLONY COUNT: CPT | Performed by: UROLOGY

## 2018-12-07 PROCEDURE — 87088 URINE BACTERIA CULTURE: CPT | Performed by: UROLOGY

## 2018-12-07 NOTE — TELEPHONE ENCOUNTER
Patient signed medical records release at registration for a print out of lab reports. Printed and given to patient. Release sent to be scanned to patient chart.

## 2018-12-12 ENCOUNTER — TELEPHONE (OUTPATIENT)
Dept: FAMILY MEDICINE CLINIC | Facility: CLINIC | Age: 68
End: 2018-12-12

## 2018-12-21 PROCEDURE — 81015 MICROSCOPIC EXAM OF URINE: CPT | Performed by: UROLOGY

## 2019-01-25 PROCEDURE — 87086 URINE CULTURE/COLONY COUNT: CPT | Performed by: UROLOGY

## 2019-01-25 PROCEDURE — 81001 URINALYSIS AUTO W/SCOPE: CPT | Performed by: UROLOGY

## 2019-02-15 ENCOUNTER — OFFICE VISIT (OUTPATIENT)
Dept: FAMILY MEDICINE CLINIC | Facility: CLINIC | Age: 69
End: 2019-02-15
Payer: MEDICARE

## 2019-02-15 VITALS
HEIGHT: 65 IN | RESPIRATION RATE: 16 BRPM | WEIGHT: 172 LBS | TEMPERATURE: 97 F | HEART RATE: 80 BPM | SYSTOLIC BLOOD PRESSURE: 194 MMHG | DIASTOLIC BLOOD PRESSURE: 82 MMHG | BODY MASS INDEX: 28.66 KG/M2

## 2019-02-15 DIAGNOSIS — E11.9 CONTROLLED TYPE 2 DIABETES MELLITUS WITHOUT COMPLICATION, WITHOUT LONG-TERM CURRENT USE OF INSULIN (HCC): Primary | ICD-10-CM

## 2019-02-15 DIAGNOSIS — I10 ESSENTIAL HYPERTENSION: ICD-10-CM

## 2019-02-15 DIAGNOSIS — E78.2 MIXED HYPERLIPIDEMIA: ICD-10-CM

## 2019-02-15 PROCEDURE — 99214 OFFICE O/P EST MOD 30 MIN: CPT | Performed by: FAMILY MEDICINE

## 2019-02-15 RX ORDER — LISINOPRIL 5 MG/1
5 TABLET ORAL
Qty: 90 TABLET | Refills: 1 | Status: SHIPPED | OUTPATIENT
Start: 2019-02-15 | End: 2019-02-15

## 2019-02-15 RX ORDER — LISINOPRIL 10 MG/1
10 TABLET ORAL
Qty: 90 TABLET | Refills: 1 | Status: SHIPPED | OUTPATIENT
Start: 2019-02-15 | End: 2019-03-07 | Stop reason: DRUGHIGH

## 2019-02-15 RX ORDER — METFORMIN HYDROCHLORIDE 500 MG/1
TABLET, EXTENDED RELEASE ORAL
Qty: 270 TABLET | Refills: 1 | Status: SHIPPED | OUTPATIENT
Start: 2019-02-15 | End: 2019-08-05

## 2019-02-15 NOTE — PROGRESS NOTES
Stacy Oakes is a 79year old female. HPI:   Patient presents for a medication check for her diabetes, hypertension and mixed hyperlipidemia. Patient reports she is taking her medications as ordered, denies any side effects.  Patient has been taking he drink(s) per week      Comment: 1 glass wine/wk    Drug use: No       REVIEW OF SYSTEMS:   GENERAL HEALTH: feels well otherwise  SKIN: denies any unusual skin lesions or rashes  RESPIRATORY: denies shortness of breath with exertion  CARDIOVASCULAR: denies 1    3. Mixed hyperlipidemia  Continue with low-fat diet  - COMP METABOLIC PANEL (14); Future  - LIPID PANEL; Future    Patient will call in 10-14 days with updated blood pressure readings.   The patient indicates understanding of these issues and agrees to

## 2019-02-28 VITALS
SYSTOLIC BLOOD PRESSURE: 142 MMHG | HEART RATE: 78 BPM | BODY MASS INDEX: 27.66 KG/M2 | WEIGHT: 166 LBS | DIASTOLIC BLOOD PRESSURE: 60 MMHG | HEIGHT: 65 IN

## 2019-03-31 ENCOUNTER — TELEPHONE (OUTPATIENT)
Dept: FAMILY MEDICINE CLINIC | Facility: CLINIC | Age: 69
End: 2019-03-31

## 2019-06-18 ENCOUNTER — TELEPHONE (OUTPATIENT)
Dept: FAMILY MEDICINE CLINIC | Facility: CLINIC | Age: 69
End: 2019-06-18

## 2019-06-18 NOTE — TELEPHONE ENCOUNTER
Received request for an H&P to be done for pt's right eye cataract surgery 8/14/19 with Dr Hosea Almanzar.    Pt called and scheduled for H&P

## 2019-06-22 ENCOUNTER — PATIENT MESSAGE (OUTPATIENT)
Dept: FAMILY MEDICINE CLINIC | Facility: CLINIC | Age: 69
End: 2019-06-22

## 2019-06-24 NOTE — TELEPHONE ENCOUNTER
From: Raysa Wright  To: Gilberto García MD  Sent: 6/22/2019 2:15 PM CDT  Subject: Non-Urgent Medical Question    This week I noticed my heart rate is a little irregular. Did a rhythm strip at my day surgery and saw a PVC.  No SOB or chest discomfort

## 2019-07-03 ENCOUNTER — MED REC SCAN ONLY (OUTPATIENT)
Dept: FAMILY MEDICINE CLINIC | Facility: CLINIC | Age: 69
End: 2019-07-03

## 2019-07-03 ENCOUNTER — OFFICE VISIT (OUTPATIENT)
Dept: FAMILY MEDICINE CLINIC | Facility: CLINIC | Age: 69
End: 2019-07-03
Payer: MEDICARE

## 2019-07-03 VITALS
HEART RATE: 88 BPM | DIASTOLIC BLOOD PRESSURE: 78 MMHG | TEMPERATURE: 97 F | BODY MASS INDEX: 27.82 KG/M2 | RESPIRATION RATE: 12 BRPM | WEIGHT: 167 LBS | HEIGHT: 65 IN | SYSTOLIC BLOOD PRESSURE: 150 MMHG

## 2019-07-03 DIAGNOSIS — R00.2 INTERMITTENT PALPITATIONS: Primary | ICD-10-CM

## 2019-07-03 PROCEDURE — 99214 OFFICE O/P EST MOD 30 MIN: CPT | Performed by: FAMILY MEDICINE

## 2019-07-03 PROCEDURE — 93000 ELECTROCARDIOGRAM COMPLETE: CPT | Performed by: FAMILY MEDICINE

## 2019-07-03 RX ORDER — MULTIVIT WITH MINERALS/LUTEIN
1000 TABLET ORAL DAILY
COMMUNITY

## 2019-07-03 RX ORDER — LISINOPRIL 10 MG/1
TABLET ORAL DAILY
COMMUNITY
Start: 2019-05-01 | End: 2020-01-27

## 2019-07-03 NOTE — PROGRESS NOTES
[facility] Family Medicine Office Note  Chief Complaint:   Patient presents with:  Irregular Heart Beat: occas irregular heart beat after coffee      HPI:   This is a 79year old female coming in for palpitations.  Patient has the symptoms for a f NIGHTLY Disp: 50 mL Rfl: 1   Cranberry 250 MG Oral Cap Take by mouth. Disp:  Rfl:    Glucose Blood (OLEG CONTOUR NEXT TEST) In Vitro Strip TEST THREE TIMES DAILY Disp: 200 strip Rfl: 3   Aspirin 81 MG Oral Tab Take 1 Tab by mouth daily.  Disp:  Rfl:    Ome monitor  -  EKG: NSR with no ST changes, no PVCs noted  -  If event monitor is abnormal, will refer to cardiology with electrophysiology  - CARD MONITOR 30 DAY EVENT;  Future      Meds & Refills for this Visit:  Requested Prescriptions      No prescriptions

## 2019-07-13 ENCOUNTER — HOSPITAL ENCOUNTER (OUTPATIENT)
Dept: CV DIAGNOSTICS | Facility: HOSPITAL | Age: 69
Discharge: HOME | End: 2019-07-13
Attending: FAMILY MEDICINE
Payer: MEDICARE

## 2019-07-13 DIAGNOSIS — R00.2 INTERMITTENT PALPITATIONS: ICD-10-CM

## 2019-07-13 PROCEDURE — 93270 REMOTE 30 DAY ECG REV/REPORT: CPT | Performed by: FAMILY MEDICINE

## 2019-07-13 PROCEDURE — 93271 ECG/MONITORING AND ANALYSIS: CPT | Performed by: FAMILY MEDICINE

## 2019-07-13 PROCEDURE — 93272 ECG/REVIEW INTERPRET ONLY: CPT | Performed by: FAMILY MEDICINE

## 2019-07-19 ENCOUNTER — LAB ENCOUNTER (OUTPATIENT)
Dept: LAB | Age: 69
End: 2019-07-19
Attending: FAMILY MEDICINE
Payer: MEDICARE

## 2019-07-19 DIAGNOSIS — I10 ESSENTIAL HYPERTENSION: ICD-10-CM

## 2019-07-19 DIAGNOSIS — E78.2 MIXED HYPERLIPIDEMIA: ICD-10-CM

## 2019-07-19 DIAGNOSIS — E11.9 CONTROLLED TYPE 2 DIABETES MELLITUS WITHOUT COMPLICATION, WITHOUT LONG-TERM CURRENT USE OF INSULIN (HCC): Primary | ICD-10-CM

## 2019-07-19 LAB
ALBUMIN SERPL-MCNC: 3.7 G/DL (ref 3.4–5)
ALBUMIN/GLOB SERPL: 1.1 {RATIO} (ref 1–2)
ALP LIVER SERPL-CCNC: 88 U/L (ref 55–142)
ALT SERPL-CCNC: 34 U/L (ref 13–56)
ANION GAP SERPL CALC-SCNC: 6 MMOL/L (ref 0–18)
AST SERPL-CCNC: 25 U/L (ref 15–37)
BILIRUB SERPL-MCNC: 0.5 MG/DL (ref 0.1–2)
BUN BLD-MCNC: 21 MG/DL (ref 7–18)
BUN/CREAT SERPL: 25 (ref 10–20)
CALCIUM BLD-MCNC: 9.6 MG/DL (ref 8.5–10.1)
CHLORIDE SERPL-SCNC: 104 MMOL/L (ref 98–112)
CHOLEST SMN-MCNC: 167 MG/DL (ref ?–200)
CO2 SERPL-SCNC: 27 MMOL/L (ref 21–32)
CREAT BLD-MCNC: 0.84 MG/DL (ref 0.55–1.02)
CREAT UR-SCNC: 55.2 MG/DL
EST. AVERAGE GLUCOSE BLD GHB EST-MCNC: 157 MG/DL (ref 68–126)
GLOBULIN PLAS-MCNC: 3.4 G/DL (ref 2.8–4.4)
GLUCOSE BLD-MCNC: 141 MG/DL (ref 70–99)
HBA1C MFR BLD HPLC: 7.1 % (ref ?–5.7)
HDLC SERPL-MCNC: 36 MG/DL (ref 40–59)
LDLC SERPL CALC-MCNC: 90 MG/DL (ref ?–100)
M PROTEIN MFR SERPL ELPH: 7.1 G/DL (ref 6.4–8.2)
MICROALBUMIN UR-MCNC: <0.5 MG/DL
NONHDLC SERPL-MCNC: 131 MG/DL (ref ?–130)
OSMOLALITY SERPL CALC.SUM OF ELEC: 289 MOSM/KG (ref 275–295)
POTASSIUM SERPL-SCNC: 4.5 MMOL/L (ref 3.5–5.1)
SODIUM SERPL-SCNC: 137 MMOL/L (ref 136–145)
TRIGL SERPL-MCNC: 205 MG/DL (ref 30–149)
VLDLC SERPL CALC-MCNC: 41 MG/DL (ref 0–30)

## 2019-07-19 PROCEDURE — 83036 HEMOGLOBIN GLYCOSYLATED A1C: CPT

## 2019-07-19 PROCEDURE — 36415 COLL VENOUS BLD VENIPUNCTURE: CPT

## 2019-07-19 PROCEDURE — 82043 UR ALBUMIN QUANTITATIVE: CPT

## 2019-07-19 PROCEDURE — 82570 ASSAY OF URINE CREATININE: CPT

## 2019-07-19 PROCEDURE — 80053 COMPREHEN METABOLIC PANEL: CPT

## 2019-07-19 PROCEDURE — 80061 LIPID PANEL: CPT

## 2019-07-21 ENCOUNTER — MA CHART PREP (OUTPATIENT)
Dept: FAMILY MEDICINE CLINIC | Facility: CLINIC | Age: 69
End: 2019-07-21

## 2019-07-29 ENCOUNTER — OFFICE VISIT (OUTPATIENT)
Dept: FAMILY MEDICINE CLINIC | Facility: CLINIC | Age: 69
End: 2019-07-29
Payer: MEDICARE

## 2019-07-29 VITALS
HEART RATE: 72 BPM | SYSTOLIC BLOOD PRESSURE: 150 MMHG | DIASTOLIC BLOOD PRESSURE: 80 MMHG | HEIGHT: 65 IN | TEMPERATURE: 98 F | BODY MASS INDEX: 28.49 KG/M2 | WEIGHT: 171 LBS | RESPIRATION RATE: 16 BRPM

## 2019-07-29 DIAGNOSIS — Z12.31 ENCOUNTER FOR SCREENING MAMMOGRAM FOR MALIGNANT NEOPLASM OF BREAST: ICD-10-CM

## 2019-07-29 DIAGNOSIS — Z00.00 ENCOUNTER FOR ANNUAL HEALTH EXAMINATION: Primary | ICD-10-CM

## 2019-07-29 DIAGNOSIS — Z23 NEED FOR VACCINATION: ICD-10-CM

## 2019-07-29 DIAGNOSIS — E11.9 CONTROLLED TYPE 2 DIABETES MELLITUS WITHOUT COMPLICATION, WITHOUT LONG-TERM CURRENT USE OF INSULIN (HCC): ICD-10-CM

## 2019-07-29 DIAGNOSIS — I10 ESSENTIAL HYPERTENSION: ICD-10-CM

## 2019-07-29 DIAGNOSIS — Z11.59 NEED FOR HEPATITIS C SCREENING TEST: ICD-10-CM

## 2019-07-29 DIAGNOSIS — Z78.0 POST-MENOPAUSAL: ICD-10-CM

## 2019-07-29 LAB
APPEARANCE: CLEAR
MULTISTIX LOT#: NORMAL NUMERIC
PH, URINE: 5 (ref 4.5–8)
SPECIFIC GRAVITY: 1.01 (ref 1–1.03)
URINE-COLOR: YELLOW
UROBILINOGEN,SEMI-QN: 0.2 MG/DL (ref 0–1.9)

## 2019-07-29 PROCEDURE — 99397 PER PM REEVAL EST PAT 65+ YR: CPT | Performed by: FAMILY MEDICINE

## 2019-07-29 PROCEDURE — 81003 URINALYSIS AUTO W/O SCOPE: CPT | Performed by: FAMILY MEDICINE

## 2019-07-29 PROCEDURE — 90732 PPSV23 VACC 2 YRS+ SUBQ/IM: CPT | Performed by: FAMILY MEDICINE

## 2019-07-29 PROCEDURE — G0009 ADMIN PNEUMOCOCCAL VACCINE: HCPCS | Performed by: FAMILY MEDICINE

## 2019-07-29 PROCEDURE — G0438 PPPS, INITIAL VISIT: HCPCS | Performed by: FAMILY MEDICINE

## 2019-07-29 PROCEDURE — 96160 PT-FOCUSED HLTH RISK ASSMT: CPT | Performed by: FAMILY MEDICINE

## 2019-07-29 RX ORDER — OFLOXACIN 3 MG/ML
SOLUTION/ DROPS OPHTHALMIC
Refills: 1 | COMMUNITY
Start: 2019-07-22 | End: 2020-02-03 | Stop reason: ALTCHOICE

## 2019-07-29 RX ORDER — PREDNISOLONE ACETATE 10 MG/ML
SUSPENSION/ DROPS OPHTHALMIC
Refills: 1 | COMMUNITY
Start: 2019-07-22 | End: 2020-02-03 | Stop reason: ALTCHOICE

## 2019-07-29 NOTE — PROGRESS NOTES
HPI:   Saad Johnson is a 79year old female who presents for a MA (Medicare Advantage) Supervisit (Once per calendar year). Has been well. No new complaints  Hx of left breast cancer with one node involvement. Dx'd in 2002. Walks for exercise. we do NOT have it on file in [address] [place] [address] Rd. The patient has this document but we do not have it in [facility], and patient is instructed to get our office a copy of it for scanning into Epic.            She has never smoked tobacco.    CAGE Alcohol screening   Rosario GUTHRIE capsule by mouth daily. MEDICAL INFORMATION:   She  has a past medical history of Breast CA ([facility] [address] (2002), History of mammogram (7/31/12), and [facility]     She  has a past surgical history that includes revise median n/carpal Corrected Left Eye Chart Acuity: 20/40   Both Eyes Visual Acuity: Corrected Both Eyes Chart Acuity: 20/40   Able To Tolerate Visual Acuity: Yes      General Appearance:  Alert, cooperative, no distress, appears stated age   Head:  Normocephalic, without ob for this visit:    1. Encounter for annual health examination  See above    2.  Controlled type 2 diabetes mellitus without complication, without long-term current use of insulin (HCC)  Continue metformin  mg 2 tablets every morning and 1 tablet every (FSB)Annually Glucose (mg/dL)   Date Value   07/19/2019 141 (H)     GLUCOSE (mg/dL)   Date Value   12/23/2015 130 (H)          Cardiovascular Disease Screening     LDL Annually LDL Cholesterol (mg/dL)   Date Value   07/19/2019 90     LDL-CHOLESTEROL (mg/dL factors:   End-stage renal disease   Hemophiliacs who received Factor VIII or IX concentrates   Clients of institutions for the mentally retarded   Persons who live in the same house as a HepB virus carrier   Homosexual men   Illicit injectable drug abuser

## 2019-07-29 NOTE — PATIENT INSTRUCTIONS
Rosario Robb's SCREENING SCHEDULE   Tests on this list are recommended by your physician but may not be covered, or covered at this frequency, by your insurer. Please check with your insurance carrier before scheduling to verify coverage.    JOSEF results found for this or any previous visit.  Limited to patients who meet one of the following criteria:   • Men who are 73-68 years old and have smoked more than 100 cigarettes in their lifetime   • Anyone with a family history    Colorectal Cancer [place] [address] Mammogram due on 05/25/2019 Please get this Mammogram regularly   Immunizations      Influenza  Covered Annually No orders found for this or any previous visit.  Please get every year    Pneumococcal 13 (Prevnar)  Covered Once after 65 Orders placed or p it's website for anyone to review and print using their home computer and printer. (the forms are also available in [address])  [email or web address]  This link also has information from the [facility] regarding Advance Directives.

## 2019-07-30 RX ORDER — LISINOPRIL 10 MG/1
TABLET ORAL
Qty: 90 TABLET | Refills: 1 | Status: SHIPPED | OUTPATIENT
Start: 2019-07-30 | End: 2019-08-05

## 2019-08-05 ENCOUNTER — OFFICE VISIT (OUTPATIENT)
Dept: FAMILY MEDICINE CLINIC | Facility: CLINIC | Age: 69
End: 2019-08-05
Payer: MEDICARE

## 2019-08-05 VITALS
HEIGHT: 65 IN | TEMPERATURE: 97 F | SYSTOLIC BLOOD PRESSURE: 138 MMHG | HEART RATE: 84 BPM | BODY MASS INDEX: 28.16 KG/M2 | RESPIRATION RATE: 16 BRPM | DIASTOLIC BLOOD PRESSURE: 80 MMHG | WEIGHT: 169 LBS

## 2019-08-05 DIAGNOSIS — E11.9 CONTROLLED TYPE 2 DIABETES MELLITUS WITHOUT COMPLICATION, WITHOUT LONG-TERM CURRENT USE OF INSULIN (HCC): ICD-10-CM

## 2019-08-05 DIAGNOSIS — H25.9 SENILE CATARACT OF RIGHT EYE, UNSPECIFIED AGE-RELATED CATARACT TYPE: ICD-10-CM

## 2019-08-05 DIAGNOSIS — Z01.818 PREOPERATIVE CLEARANCE: Primary | ICD-10-CM

## 2019-08-05 DIAGNOSIS — N30.90 CYSTITIS: ICD-10-CM

## 2019-08-05 DIAGNOSIS — I10 ESSENTIAL HYPERTENSION: ICD-10-CM

## 2019-08-05 DIAGNOSIS — R82.90 ABNORMAL URINALYSIS: ICD-10-CM

## 2019-08-05 PROCEDURE — 87086 URINE CULTURE/COLONY COUNT: CPT | Performed by: FAMILY MEDICINE

## 2019-08-05 PROCEDURE — 99214 OFFICE O/P EST MOD 30 MIN: CPT | Performed by: FAMILY MEDICINE

## 2019-08-05 PROCEDURE — 81003 URINALYSIS AUTO W/O SCOPE: CPT | Performed by: FAMILY MEDICINE

## 2019-08-05 RX ORDER — SULFAMETHOXAZOLE AND TRIMETHOPRIM 800; 160 MG/1; MG/1
1 TABLET ORAL 2 TIMES DAILY
Qty: 14 TABLET | Refills: 0 | Status: SHIPPED | OUTPATIENT
Start: 2019-08-05 | End: 2019-08-12

## 2019-08-05 RX ORDER — METFORMIN HYDROCHLORIDE 500 MG/1
TABLET, EXTENDED RELEASE ORAL
Qty: 270 TABLET | Refills: 1 | Status: SHIPPED | OUTPATIENT
Start: 2019-08-05 | End: 2020-02-01

## 2019-08-05 NOTE — PROGRESS NOTES
Lul Meadows is a 81year old female who presents for a pre-operative physical exam. Patient is to have right cataract extraction/IOL, to be done by Dr. Giuliano Davis at [facility] on 8/14/2019.   He did to    HPI:   Pt complains of gradual Onset   • Cancer Father 46        lung   • Breast Cancer Self 50   • Ovarian Cancer Sister 55      Social History:   Social History    Tobacco Use      Smoking status: Never Smoker      Smokeless tobacco: Never Used    Alcohol use:  Yes      Alcohol/week: 0 significant history of cardiac or pulmonary conditions. Pt is a good surgical candidate. This consult was sent back the referring physician, Dr. Abiel Quiles. 1. Preoperative clearance  See above    2.  Senile cataract of right eye, unspecified age-related

## 2019-10-14 ENCOUNTER — IMMUNIZATION (OUTPATIENT)
Dept: FAMILY MEDICINE CLINIC | Facility: CLINIC | Age: 69
End: 2019-10-14
Payer: MEDICARE

## 2019-10-14 DIAGNOSIS — Z23 NEED FOR VACCINATION: ICD-10-CM

## 2019-10-14 PROCEDURE — 90662 IIV NO PRSV INCREASED AG IM: CPT | Performed by: FAMILY MEDICINE

## 2019-10-14 PROCEDURE — G0008 ADMIN INFLUENZA VIRUS VAC: HCPCS | Performed by: FAMILY MEDICINE

## 2019-11-07 ENCOUNTER — HOSPITAL ENCOUNTER (OUTPATIENT)
Dept: BONE DENSITY | Age: 69
Discharge: HOME | End: 2019-11-07
Attending: FAMILY MEDICINE
Payer: MEDICARE

## 2019-11-07 ENCOUNTER — HOSPITAL ENCOUNTER (OUTPATIENT)
Dept: MAMMOGRAPHY | Age: 69
Discharge: HOME | End: 2019-11-07
Attending: FAMILY MEDICINE
Payer: MEDICARE

## 2019-11-07 DIAGNOSIS — Z12.31 ENCOUNTER FOR SCREENING MAMMOGRAM FOR MALIGNANT NEOPLASM OF BREAST: ICD-10-CM

## 2019-11-07 DIAGNOSIS — Z78.0 POST-MENOPAUSAL: ICD-10-CM

## 2019-11-07 PROCEDURE — 77063 BREAST TOMOSYNTHESIS BI: CPT | Performed by: FAMILY MEDICINE

## 2019-11-07 PROCEDURE — 77080 DXA BONE DENSITY AXIAL: CPT | Performed by: FAMILY MEDICINE

## 2019-11-07 PROCEDURE — 77067 SCR MAMMO BI INCL CAD: CPT | Performed by: FAMILY MEDICINE

## 2020-01-27 RX ORDER — LISINOPRIL 10 MG/1
TABLET ORAL
Qty: 90 TABLET | Refills: 0 | Status: SHIPPED | OUTPATIENT
Start: 2020-01-27 | End: 2020-04-26

## 2020-01-28 ENCOUNTER — APPOINTMENT (OUTPATIENT)
Dept: LAB | Age: 70
End: 2020-01-28
Attending: FAMILY MEDICINE
Payer: MEDICARE

## 2020-01-28 DIAGNOSIS — Z11.59 NEED FOR HEPATITIS C SCREENING TEST: ICD-10-CM

## 2020-01-28 DIAGNOSIS — E11.9 CONTROLLED TYPE 2 DIABETES MELLITUS WITHOUT COMPLICATION, WITHOUT LONG-TERM CURRENT USE OF INSULIN (HCC): ICD-10-CM

## 2020-01-28 DIAGNOSIS — I10 ESSENTIAL HYPERTENSION: ICD-10-CM

## 2020-01-28 LAB
ALBUMIN SERPL-MCNC: 3.8 G/DL (ref 3.4–5)
ALBUMIN/GLOB SERPL: 1.1 {RATIO} (ref 1–2)
ALP LIVER SERPL-CCNC: 80 U/L (ref 55–142)
ALT SERPL-CCNC: 20 U/L (ref 13–56)
ANION GAP SERPL CALC-SCNC: 6 MMOL/L (ref 0–18)
AST SERPL-CCNC: 18 U/L (ref 15–37)
BILIRUB SERPL-MCNC: 0.5 MG/DL (ref 0.1–2)
BUN BLD-MCNC: 17 MG/DL (ref 7–18)
BUN/CREAT SERPL: 17.3 (ref 10–20)
CALCIUM BLD-MCNC: 9.1 MG/DL (ref 8.5–10.1)
CHLORIDE SERPL-SCNC: 105 MMOL/L (ref 98–112)
CHOLEST SMN-MCNC: 209 MG/DL (ref ?–200)
CO2 SERPL-SCNC: 29 MMOL/L (ref 21–32)
CREAT BLD-MCNC: 0.98 MG/DL (ref 0.55–1.02)
CREAT UR-SCNC: 64.9 MG/DL
EST. AVERAGE GLUCOSE BLD GHB EST-MCNC: 146 MG/DL (ref 68–126)
GLOBULIN PLAS-MCNC: 3.6 G/DL (ref 2.8–4.4)
GLUCOSE BLD-MCNC: 136 MG/DL (ref 70–99)
HBA1C MFR BLD HPLC: 6.7 % (ref ?–5.7)
HCV AB SERPL QL IA: NONREACTIVE
HDLC SERPL-MCNC: 37 MG/DL (ref 40–59)
LDLC SERPL CALC-MCNC: 119 MG/DL (ref ?–100)
M PROTEIN MFR SERPL ELPH: 7.4 G/DL (ref 6.4–8.2)
MICROALBUMIN UR-MCNC: <0.5 MG/DL
NONHDLC SERPL-MCNC: 172 MG/DL (ref ?–130)
OSMOLALITY SERPL CALC.SUM OF ELEC: 294 MOSM/KG (ref 275–295)
PATIENT FASTING Y/N/NP: YES
PATIENT FASTING Y/N/NP: YES
POTASSIUM SERPL-SCNC: 4.3 MMOL/L (ref 3.5–5.1)
SODIUM SERPL-SCNC: 140 MMOL/L (ref 136–145)
TRIGL SERPL-MCNC: 266 MG/DL (ref 30–149)
VLDLC SERPL CALC-MCNC: 53 MG/DL (ref 0–30)

## 2020-01-28 PROCEDURE — 80061 LIPID PANEL: CPT

## 2020-01-28 PROCEDURE — 80053 COMPREHEN METABOLIC PANEL: CPT

## 2020-01-28 PROCEDURE — 83036 HEMOGLOBIN GLYCOSYLATED A1C: CPT

## 2020-01-28 PROCEDURE — 86803 HEPATITIS C AB TEST: CPT

## 2020-01-28 PROCEDURE — 82570 ASSAY OF URINE CREATININE: CPT

## 2020-01-28 PROCEDURE — 82043 UR ALBUMIN QUANTITATIVE: CPT

## 2020-01-28 PROCEDURE — 36415 COLL VENOUS BLD VENIPUNCTURE: CPT

## 2020-02-01 DIAGNOSIS — E11.9 CONTROLLED TYPE 2 DIABETES MELLITUS WITHOUT COMPLICATION, WITHOUT LONG-TERM CURRENT USE OF INSULIN (HCC): ICD-10-CM

## 2020-02-01 RX ORDER — METFORMIN HYDROCHLORIDE 500 MG/1
TABLET, EXTENDED RELEASE ORAL
Qty: 270 TABLET | Refills: 1 | Status: SHIPPED | OUTPATIENT
Start: 2020-02-01 | End: 2020-07-30

## 2020-02-03 ENCOUNTER — TELEPHONE (OUTPATIENT)
Dept: FAMILY MEDICINE CLINIC | Facility: CLINIC | Age: 70
End: 2020-02-03

## 2020-02-03 ENCOUNTER — OFFICE VISIT (OUTPATIENT)
Dept: FAMILY MEDICINE CLINIC | Facility: CLINIC | Age: 70
End: 2020-02-03
Payer: MEDICARE

## 2020-02-03 VITALS
HEART RATE: 72 BPM | BODY MASS INDEX: 27.99 KG/M2 | WEIGHT: 168 LBS | DIASTOLIC BLOOD PRESSURE: 78 MMHG | RESPIRATION RATE: 16 BRPM | TEMPERATURE: 97 F | HEIGHT: 65 IN | SYSTOLIC BLOOD PRESSURE: 142 MMHG

## 2020-02-03 DIAGNOSIS — R39.15 URINARY URGENCY: ICD-10-CM

## 2020-02-03 DIAGNOSIS — N30.00 ACUTE CYSTITIS WITHOUT HEMATURIA: ICD-10-CM

## 2020-02-03 DIAGNOSIS — R82.90 ABNORMAL URINALYSIS: ICD-10-CM

## 2020-02-03 DIAGNOSIS — I10 ESSENTIAL HYPERTENSION: ICD-10-CM

## 2020-02-03 DIAGNOSIS — E11.9 CONTROLLED TYPE 2 DIABETES MELLITUS WITHOUT COMPLICATION, WITHOUT LONG-TERM CURRENT USE OF INSULIN (HCC): Primary | ICD-10-CM

## 2020-02-03 DIAGNOSIS — E78.2 MIXED HYPERLIPIDEMIA: ICD-10-CM

## 2020-02-03 LAB
APPEARANCE: CLEAR
MULTISTIX LOT#: NORMAL NUMERIC
PH, URINE: 5.5 (ref 4.5–8)
SPECIFIC GRAVITY: 1.02 (ref 1–1.03)
URINE-COLOR: YELLOW
UROBILINOGEN,SEMI-QN: 0.2 MG/DL (ref 0–1.9)

## 2020-02-03 PROCEDURE — 87086 URINE CULTURE/COLONY COUNT: CPT | Performed by: FAMILY MEDICINE

## 2020-02-03 PROCEDURE — 87186 SC STD MICRODIL/AGAR DIL: CPT | Performed by: FAMILY MEDICINE

## 2020-02-03 PROCEDURE — 87088 URINE BACTERIA CULTURE: CPT | Performed by: FAMILY MEDICINE

## 2020-02-03 PROCEDURE — 81003 URINALYSIS AUTO W/O SCOPE: CPT | Performed by: FAMILY MEDICINE

## 2020-02-03 PROCEDURE — 99214 OFFICE O/P EST MOD 30 MIN: CPT | Performed by: FAMILY MEDICINE

## 2020-02-03 RX ORDER — SULFAMETHOXAZOLE AND TRIMETHOPRIM 800; 160 MG/1; MG/1
1 TABLET ORAL 2 TIMES DAILY
Qty: 14 TABLET | Refills: 0 | Status: SHIPPED | OUTPATIENT
Start: 2020-02-03 | End: 2020-02-10

## 2020-02-03 RX ORDER — ATORVASTATIN CALCIUM 10 MG/1
10 TABLET, FILM COATED ORAL NIGHTLY
Qty: 90 TABLET | Refills: 1 | Status: SHIPPED
Start: 2020-02-03 | End: 2020-07-10

## 2020-02-03 RX ORDER — ATORVASTATIN CALCIUM 10 MG/1
10 TABLET, FILM COATED ORAL NIGHTLY
Qty: 90 TABLET | Refills: 1 | Status: SHIPPED | OUTPATIENT
Start: 2020-02-03 | End: 2020-02-03

## 2020-02-03 NOTE — PROGRESS NOTES
Cindy Delvalle is a 81year old female. HPI:   Patient presents for a medication check for her diabetes, hypertension and mixed hyperlipidemia. Patient reports she is taking her medications as ordered, denies any side effects.  Patient has been taking he Tab Take 1,000 mg by mouth daily. • Clobetasol Propionate 0.05 % External Solution APPLY TOPICALLY TO AFFECTED AREA(S) NIGHTLY 50 mL 1   • Cranberry 250 MG Oral Cap Take by mouth. • Aspirin 81 MG Oral Tab Take 1 Tab by mouth daily.      • Omega-3 Fa pedal pulse exam of both lower legs/feet is normal as well. ASSESSMENT AND PLAN:   1.  Controlled type 2 diabetes mellitus without complication, without long-term current use of insulin (HCC)  Continue metformin  tablets every morning and 1 tablet

## 2020-02-03 NOTE — TELEPHONE ENCOUNTER
Record shows OV today-atorvastatin order placed to [facility] today. Medication record updated. [facility] as pt requests.

## 2020-02-03 NOTE — TELEPHONE ENCOUNTER
Pt would like the medication atorvastatin 10 MG Oral Tab sent to [facility]. States that [facility] no longer supplies this for free. Please advise.

## 2020-04-26 RX ORDER — LISINOPRIL 10 MG/1
TABLET ORAL
Qty: 90 TABLET | Refills: 3 | Status: SHIPPED | OUTPATIENT
Start: 2020-04-26 | End: 2021-04-21

## 2020-07-10 DIAGNOSIS — E78.2 MIXED HYPERLIPIDEMIA: ICD-10-CM

## 2020-07-10 RX ORDER — ATORVASTATIN CALCIUM 10 MG/1
TABLET, FILM COATED ORAL
Qty: 90 TABLET | Refills: 0 | Status: SHIPPED | OUTPATIENT
Start: 2020-07-10 | End: 2020-10-08

## 2020-07-16 ENCOUNTER — MED REC SCAN ONLY (OUTPATIENT)
Dept: FAMILY MEDICINE CLINIC | Facility: CLINIC | Age: 70
End: 2020-07-16

## 2020-07-30 DIAGNOSIS — E11.9 CONTROLLED TYPE 2 DIABETES MELLITUS WITHOUT COMPLICATION, WITHOUT LONG-TERM CURRENT USE OF INSULIN (HCC): ICD-10-CM

## 2020-07-30 RX ORDER — METFORMIN HYDROCHLORIDE 500 MG/1
TABLET, EXTENDED RELEASE ORAL
Qty: 270 TABLET | Refills: 0 | Status: SHIPPED | OUTPATIENT
Start: 2020-07-30 | End: 2020-10-28

## 2020-08-27 ENCOUNTER — TELEPHONE (OUTPATIENT)
Dept: FAMILY MEDICINE CLINIC | Facility: CLINIC | Age: 70
End: 2020-08-27

## 2020-08-27 DIAGNOSIS — I10 ESSENTIAL HYPERTENSION: ICD-10-CM

## 2020-08-27 DIAGNOSIS — E78.2 MIXED HYPERLIPIDEMIA: ICD-10-CM

## 2020-08-27 DIAGNOSIS — E11.9 CONTROLLED TYPE 2 DIABETES MELLITUS WITHOUT COMPLICATION, WITHOUT LONG-TERM CURRENT USE OF INSULIN (HCC): Primary | ICD-10-CM

## 2020-08-27 NOTE — TELEPHONE ENCOUNTER
Call from pt states wanting to do labs \"but all lab orders are not in the system. Only orders are for CMP and lipids. Need orders for A1C and urine test too. \"  Review of record shows last labs done 1/28/2020.  Last OV advised CMP and lipids in 3 months-wh

## 2020-09-14 ENCOUNTER — LAB ENCOUNTER (OUTPATIENT)
Dept: LAB | Age: 70
End: 2020-09-14
Attending: FAMILY MEDICINE
Payer: MEDICARE

## 2020-09-14 DIAGNOSIS — I10 ESSENTIAL HYPERTENSION: ICD-10-CM

## 2020-09-14 DIAGNOSIS — E78.2 MIXED HYPERLIPIDEMIA: ICD-10-CM

## 2020-09-14 DIAGNOSIS — E11.9 CONTROLLED TYPE 2 DIABETES MELLITUS WITHOUT COMPLICATION, WITHOUT LONG-TERM CURRENT USE OF INSULIN (HCC): ICD-10-CM

## 2020-09-14 LAB
ALBUMIN SERPL-MCNC: 3.8 G/DL (ref 3.4–5)
ALBUMIN/GLOB SERPL: 1.2 {RATIO} (ref 1–2)
ALP LIVER SERPL-CCNC: 86 U/L (ref 55–142)
ALT SERPL-CCNC: 32 U/L (ref 13–56)
ANION GAP SERPL CALC-SCNC: 4 MMOL/L (ref 0–18)
AST SERPL-CCNC: 19 U/L (ref 15–37)
BILIRUB SERPL-MCNC: 0.6 MG/DL (ref 0.1–2)
BUN BLD-MCNC: 20 MG/DL (ref 7–18)
BUN/CREAT SERPL: 18 (ref 10–20)
CALCIUM BLD-MCNC: 9.7 MG/DL (ref 8.5–10.1)
CHLORIDE SERPL-SCNC: 103 MMOL/L (ref 98–112)
CHOLEST SMN-MCNC: 95 MG/DL (ref ?–200)
CO2 SERPL-SCNC: 30 MMOL/L (ref 21–32)
CREAT BLD-MCNC: 1.11 MG/DL (ref 0.55–1.02)
CREAT UR-SCNC: 280 MG/DL
EST. AVERAGE GLUCOSE BLD GHB EST-MCNC: 171 MG/DL (ref 68–126)
GLOBULIN PLAS-MCNC: 3.2 G/DL (ref 2.8–4.4)
GLUCOSE BLD-MCNC: 151 MG/DL (ref 70–99)
HBA1C MFR BLD HPLC: 7.6 % (ref ?–5.7)
HDLC SERPL-MCNC: 35 MG/DL (ref 40–59)
LDLC SERPL CALC-MCNC: 32 MG/DL (ref ?–100)
M PROTEIN MFR SERPL ELPH: 7 G/DL (ref 6.4–8.2)
MICROALBUMIN UR-MCNC: 2.03 MG/DL
MICROALBUMIN/CREAT 24H UR-RTO: 7.3 UG/MG (ref ?–30)
NONHDLC SERPL-MCNC: 60 MG/DL (ref ?–130)
OSMOLALITY SERPL CALC.SUM OF ELEC: 290 MOSM/KG (ref 275–295)
PATIENT FASTING Y/N/NP: YES
PATIENT FASTING Y/N/NP: YES
POTASSIUM SERPL-SCNC: 4.9 MMOL/L (ref 3.5–5.1)
SODIUM SERPL-SCNC: 137 MMOL/L (ref 136–145)
TRIGL SERPL-MCNC: 139 MG/DL (ref 30–149)
VLDLC SERPL CALC-MCNC: 28 MG/DL (ref 0–30)

## 2020-09-14 PROCEDURE — 36415 COLL VENOUS BLD VENIPUNCTURE: CPT

## 2020-09-14 PROCEDURE — 83036 HEMOGLOBIN GLYCOSYLATED A1C: CPT

## 2020-09-14 PROCEDURE — 82043 UR ALBUMIN QUANTITATIVE: CPT

## 2020-09-14 PROCEDURE — 82570 ASSAY OF URINE CREATININE: CPT

## 2020-09-14 PROCEDURE — 80053 COMPREHEN METABOLIC PANEL: CPT

## 2020-09-14 PROCEDURE — 80061 LIPID PANEL: CPT

## 2020-09-21 ENCOUNTER — OFFICE VISIT (OUTPATIENT)
Dept: FAMILY MEDICINE CLINIC | Facility: CLINIC | Age: 70
End: 2020-09-21
Payer: MEDICARE

## 2020-09-21 VITALS
SYSTOLIC BLOOD PRESSURE: 128 MMHG | HEART RATE: 76 BPM | DIASTOLIC BLOOD PRESSURE: 76 MMHG | BODY MASS INDEX: 28.82 KG/M2 | HEIGHT: 65 IN | RESPIRATION RATE: 16 BRPM | TEMPERATURE: 96 F | WEIGHT: 173 LBS

## 2020-09-21 DIAGNOSIS — E78.2 MIXED HYPERLIPIDEMIA: ICD-10-CM

## 2020-09-21 DIAGNOSIS — I10 ESSENTIAL HYPERTENSION: ICD-10-CM

## 2020-09-21 DIAGNOSIS — Z23 NEED FOR VACCINATION: ICD-10-CM

## 2020-09-21 DIAGNOSIS — C50.912 MALIGNANT NEOPLASM OF LEFT FEMALE BREAST, UNSPECIFIED ESTROGEN RECEPTOR STATUS, UNSPECIFIED SITE OF BREAST (HCC): ICD-10-CM

## 2020-09-21 DIAGNOSIS — E11.9 CONTROLLED TYPE 2 DIABETES MELLITUS WITHOUT COMPLICATION, WITHOUT LONG-TERM CURRENT USE OF INSULIN (HCC): Primary | ICD-10-CM

## 2020-09-21 PROCEDURE — 3074F SYST BP LT 130 MM HG: CPT | Performed by: FAMILY MEDICINE

## 2020-09-21 PROCEDURE — 99214 OFFICE O/P EST MOD 30 MIN: CPT | Performed by: FAMILY MEDICINE

## 2020-09-21 PROCEDURE — 90662 IIV NO PRSV INCREASED AG IM: CPT | Performed by: FAMILY MEDICINE

## 2020-09-21 PROCEDURE — 3078F DIAST BP <80 MM HG: CPT | Performed by: FAMILY MEDICINE

## 2020-09-21 PROCEDURE — 90471 IMMUNIZATION ADMIN: CPT | Performed by: FAMILY MEDICINE

## 2020-09-21 PROCEDURE — 3008F BODY MASS INDEX DOCD: CPT | Performed by: FAMILY MEDICINE

## 2020-09-21 NOTE — PROGRESS NOTES
Pebbles Rivera is a 78year old female. HPI:   Patient presents for a medication check for her diabetes, hypertension and mixed hyperlipidemia. Patient reports she is taking her medications as ordered, denies any side effects.  Patient has been taking he History    Tobacco Use      Smoking status: Never Smoker      Smokeless tobacco: Never Used    Alcohol use: Yes      Alcohol/week: 0.8 standard drinks      Types: 1 Standard drinks or equivalent per week      Comment: 1 glass wine/wk    Drug use:  No Mixed hyperlipidemia  Continue atorvastatin 10 mg 1 tablet nightly  - COMP METABOLIC PANEL (14); Future  - LIPID PANEL; Future    4.  Malignant neoplasm of left female breast, unspecified estrogen receptor status, unspecified site of breast ([facility] [address]  Continue

## 2020-10-08 DIAGNOSIS — E78.2 MIXED HYPERLIPIDEMIA: ICD-10-CM

## 2020-10-08 RX ORDER — ATORVASTATIN CALCIUM 10 MG/1
TABLET, FILM COATED ORAL
Qty: 90 TABLET | Refills: 1 | Status: SHIPPED | OUTPATIENT
Start: 2020-10-08 | End: 2021-04-06

## 2020-10-28 DIAGNOSIS — E11.9 CONTROLLED TYPE 2 DIABETES MELLITUS WITHOUT COMPLICATION, WITHOUT LONG-TERM CURRENT USE OF INSULIN (HCC): ICD-10-CM

## 2020-10-28 RX ORDER — METFORMIN HYDROCHLORIDE 500 MG/1
TABLET, EXTENDED RELEASE ORAL
Qty: 270 TABLET | Refills: 1 | Status: SHIPPED | OUTPATIENT
Start: 2020-10-28 | End: 2021-04-26

## 2021-01-29 ENCOUNTER — TELEPHONE (OUTPATIENT)
Dept: FAMILY MEDICINE CLINIC | Facility: CLINIC | Age: 71
End: 2021-01-29

## 2021-02-01 DIAGNOSIS — Z23 NEED FOR VACCINATION: ICD-10-CM

## 2021-02-03 ENCOUNTER — IMMUNIZATION (OUTPATIENT)
Dept: LAB | Age: 71
End: 2021-02-03
Attending: HOSPITALIST
Payer: MEDICARE

## 2021-02-03 DIAGNOSIS — Z23 NEED FOR VACCINATION: Primary | ICD-10-CM

## 2021-02-03 PROCEDURE — 0001A SARSCOV2 VAC 30MCG/0.3ML IM: CPT

## 2021-02-24 ENCOUNTER — IMMUNIZATION (OUTPATIENT)
Dept: LAB | Age: 71
End: 2021-02-24
Attending: HOSPITALIST
Payer: MEDICARE

## 2021-02-24 DIAGNOSIS — Z23 NEED FOR VACCINATION: Primary | ICD-10-CM

## 2021-02-24 PROCEDURE — 0002A SARSCOV2 VAC 30MCG/0.3ML IM: CPT

## 2021-04-06 DIAGNOSIS — E78.2 MIXED HYPERLIPIDEMIA: ICD-10-CM

## 2021-04-06 RX ORDER — ATORVASTATIN CALCIUM 10 MG/1
TABLET, FILM COATED ORAL
Qty: 90 TABLET | Refills: 1 | Status: SHIPPED | OUTPATIENT
Start: 2021-04-06 | End: 2021-10-04

## 2021-04-20 ENCOUNTER — LAB ENCOUNTER (OUTPATIENT)
Dept: LAB | Age: 71
End: 2021-04-20
Attending: FAMILY MEDICINE
Payer: MEDICARE

## 2021-04-20 DIAGNOSIS — E11.9 CONTROLLED TYPE 2 DIABETES MELLITUS WITHOUT COMPLICATION, WITHOUT LONG-TERM CURRENT USE OF INSULIN (HCC): ICD-10-CM

## 2021-04-20 DIAGNOSIS — E78.2 MIXED HYPERLIPIDEMIA: ICD-10-CM

## 2021-04-20 DIAGNOSIS — I10 ESSENTIAL HYPERTENSION: ICD-10-CM

## 2021-04-20 PROCEDURE — 36415 COLL VENOUS BLD VENIPUNCTURE: CPT

## 2021-04-20 PROCEDURE — 80061 LIPID PANEL: CPT

## 2021-04-20 PROCEDURE — 82043 UR ALBUMIN QUANTITATIVE: CPT

## 2021-04-20 PROCEDURE — 83036 HEMOGLOBIN GLYCOSYLATED A1C: CPT

## 2021-04-20 PROCEDURE — 3061F NEG MICROALBUMINURIA REV: CPT | Performed by: FAMILY MEDICINE

## 2021-04-20 PROCEDURE — 80053 COMPREHEN METABOLIC PANEL: CPT

## 2021-04-20 PROCEDURE — 82570 ASSAY OF URINE CREATININE: CPT

## 2021-04-21 RX ORDER — LISINOPRIL 10 MG/1
TABLET ORAL
Qty: 90 TABLET | Refills: 0 | Status: SHIPPED | OUTPATIENT
Start: 2021-04-21 | End: 2021-07-20

## 2021-04-26 ENCOUNTER — OFFICE VISIT (OUTPATIENT)
Dept: FAMILY MEDICINE CLINIC | Facility: CLINIC | Age: 71
End: 2021-04-26
Payer: MEDICARE

## 2021-04-26 VITALS
TEMPERATURE: 98 F | BODY MASS INDEX: 29.49 KG/M2 | HEIGHT: 65 IN | SYSTOLIC BLOOD PRESSURE: 144 MMHG | RESPIRATION RATE: 16 BRPM | DIASTOLIC BLOOD PRESSURE: 72 MMHG | HEART RATE: 84 BPM | WEIGHT: 177 LBS

## 2021-04-26 DIAGNOSIS — Z85.3 HISTORY OF LEFT BREAST CANCER: ICD-10-CM

## 2021-04-26 DIAGNOSIS — E11.9 CONTROLLED TYPE 2 DIABETES MELLITUS WITHOUT COMPLICATION, WITHOUT LONG-TERM CURRENT USE OF INSULIN (HCC): ICD-10-CM

## 2021-04-26 DIAGNOSIS — Z12.31 ENCOUNTER FOR SCREENING MAMMOGRAM FOR MALIGNANT NEOPLASM OF BREAST: ICD-10-CM

## 2021-04-26 DIAGNOSIS — E11.9 CONTROLLED TYPE 2 DIABETES MELLITUS WITHOUT COMPLICATION, WITHOUT LONG-TERM CURRENT USE OF INSULIN (HCC): Primary | ICD-10-CM

## 2021-04-26 DIAGNOSIS — I10 ESSENTIAL HYPERTENSION: ICD-10-CM

## 2021-04-26 DIAGNOSIS — E78.2 MIXED HYPERLIPIDEMIA: ICD-10-CM

## 2021-04-26 PROCEDURE — 3078F DIAST BP <80 MM HG: CPT | Performed by: FAMILY MEDICINE

## 2021-04-26 PROCEDURE — 3008F BODY MASS INDEX DOCD: CPT | Performed by: FAMILY MEDICINE

## 2021-04-26 PROCEDURE — 3077F SYST BP >= 140 MM HG: CPT | Performed by: FAMILY MEDICINE

## 2021-04-26 PROCEDURE — 99214 OFFICE O/P EST MOD 30 MIN: CPT | Performed by: FAMILY MEDICINE

## 2021-04-26 RX ORDER — METFORMIN HYDROCHLORIDE 1000 MG/1
1000 TABLET, FILM COATED, EXTENDED RELEASE ORAL 2 TIMES DAILY WITH MEALS
Qty: 180 TABLET | Refills: 1 | Status: SHIPPED | OUTPATIENT
Start: 2021-04-26 | End: 2021-04-27

## 2021-04-26 RX ORDER — METFORMIN HYDROCHLORIDE 500 MG/1
TABLET, EXTENDED RELEASE ORAL
Qty: 270 TABLET | Refills: 3 | OUTPATIENT
Start: 2021-04-26

## 2021-04-26 NOTE — PROGRESS NOTES
Odette Brown is a 78year old female. HPI:   Patient presents for a medication check for her diabetes, hypertension and mixed hyperlipidemia. Patient reports she is taking her medications as ordered, denies any side effects.  Patient has been taking he Social History:  Social History    Tobacco Use      Smoking status: Never Smoker      Smokeless tobacco: Never Used    Vaping Use      Vaping Use: Never used    Alcohol use:  Yes      Alcohol/week: 0.8 standard drinks      Types: 1 Standard drinks or equi lisinopril 10 mg daily  - COMP METABOLIC PANEL (14); Future    3. Mixed hyperlipidemia  Continue atorvastatin 10 mg nightly  Continue aspirin 81 mg daily  - COMP METABOLIC PANEL (14); Future    4.  History of left breast cancer  - [facility] CHELSEY 2D+3D SCREENING B

## 2021-04-27 ENCOUNTER — TELEPHONE (OUTPATIENT)
Dept: FAMILY MEDICINE CLINIC | Facility: CLINIC | Age: 71
End: 2021-04-27

## 2021-04-27 DIAGNOSIS — E11.9 TYPE 2 DIABETES MELLITUS WITHOUT COMPLICATION, WITHOUT LONG-TERM CURRENT USE OF INSULIN (HCC): Primary | ICD-10-CM

## 2021-04-27 RX ORDER — METFORMIN HYDROCHLORIDE 500 MG/1
1000 TABLET, EXTENDED RELEASE ORAL 2 TIMES DAILY WITH MEALS
Qty: 360 TABLET | Refills: 0 | Status: SHIPPED | OUTPATIENT
Start: 2021-04-27 | End: 2021-07-06

## 2021-04-27 NOTE — TELEPHONE ENCOUNTER
PT HAS QUESTIONS ABOUT metFORMIN HCl ER 1000 MG (MOD) Oral Tablet 24 Hr THAT WAS CALLED IN YESTERDAY. PLEASE ADVISE.  Festus Grullon,

## 2021-04-27 NOTE — TELEPHONE ENCOUNTER
Patient called and states that her insurance states it is more expensive for her to get Metformin 1000 mg tablets vs 500 mg tablets--->metFORMIN HCl  MG Oral Tablet 24 Hr-->Sig: Take 2 tablets (1,000 mg total) by mouth 2 (two) times daily with meals.

## 2021-05-19 ENCOUNTER — TELEPHONE (OUTPATIENT)
Dept: CASE MANAGEMENT | Age: 71
End: 2021-05-19

## 2021-05-24 ENCOUNTER — TELEPHONE (OUTPATIENT)
Dept: FAMILY MEDICINE CLINIC | Facility: CLINIC | Age: 71
End: 2021-05-24

## 2021-05-24 DIAGNOSIS — E11.9 CONTROLLED TYPE 2 DIABETES MELLITUS WITHOUT COMPLICATION, WITHOUT LONG-TERM CURRENT USE OF INSULIN (HCC): ICD-10-CM

## 2021-05-24 NOTE — TELEPHONE ENCOUNTER
Pt has her MA supervisit on 9/28/21 and was wondering if she could have blood test orders placed to do ahead of time.

## 2021-06-28 ENCOUNTER — HOSPITAL ENCOUNTER (OUTPATIENT)
Dept: MAMMOGRAPHY | Age: 71
Discharge: HOME | End: 2021-06-28
Attending: FAMILY MEDICINE
Payer: MEDICARE

## 2021-06-28 DIAGNOSIS — Z12.31 ENCOUNTER FOR SCREENING MAMMOGRAM FOR MALIGNANT NEOPLASM OF BREAST: ICD-10-CM

## 2021-06-28 DIAGNOSIS — Z85.3 HISTORY OF LEFT BREAST CANCER: ICD-10-CM

## 2021-06-28 PROCEDURE — 77067 SCR MAMMO BI INCL CAD: CPT | Performed by: FAMILY MEDICINE

## 2021-06-28 PROCEDURE — 77063 BREAST TOMOSYNTHESIS BI: CPT | Performed by: FAMILY MEDICINE

## 2021-07-02 DIAGNOSIS — E11.9 TYPE 2 DIABETES MELLITUS WITHOUT COMPLICATION, WITHOUT LONG-TERM CURRENT USE OF INSULIN (HCC): ICD-10-CM

## 2021-07-06 RX ORDER — METFORMIN HYDROCHLORIDE 500 MG/1
TABLET, EXTENDED RELEASE ORAL
Qty: 360 TABLET | Refills: 0 | Status: SHIPPED | OUTPATIENT
Start: 2021-07-06 | End: 2021-10-04

## 2021-07-20 RX ORDER — LISINOPRIL 10 MG/1
TABLET ORAL
Qty: 90 TABLET | Refills: 0 | Status: SHIPPED | OUTPATIENT
Start: 2021-07-20 | End: 2021-10-18

## 2021-07-23 ENCOUNTER — MED REC SCAN ONLY (OUTPATIENT)
Dept: FAMILY MEDICINE CLINIC | Facility: CLINIC | Age: 71
End: 2021-07-23

## 2021-10-03 DIAGNOSIS — E78.2 MIXED HYPERLIPIDEMIA: ICD-10-CM

## 2021-10-03 DIAGNOSIS — E11.9 TYPE 2 DIABETES MELLITUS WITHOUT COMPLICATION, WITHOUT LONG-TERM CURRENT USE OF INSULIN (HCC): ICD-10-CM

## 2021-10-04 RX ORDER — ATORVASTATIN CALCIUM 10 MG/1
TABLET, FILM COATED ORAL
Qty: 90 TABLET | Refills: 0 | Status: SHIPPED | OUTPATIENT
Start: 2021-10-04 | End: 2022-01-03

## 2021-10-04 RX ORDER — METFORMIN HYDROCHLORIDE 500 MG/1
TABLET, EXTENDED RELEASE ORAL
Qty: 360 TABLET | Refills: 0 | Status: SHIPPED | OUTPATIENT
Start: 2021-10-04 | End: 2022-01-03

## 2021-10-18 RX ORDER — LISINOPRIL 10 MG/1
10 TABLET ORAL DAILY
Qty: 90 TABLET | Refills: 0 | Status: SHIPPED | OUTPATIENT
Start: 2021-10-18 | End: 2022-01-17

## 2021-11-02 ENCOUNTER — LAB ENCOUNTER (OUTPATIENT)
Dept: LAB | Age: 71
End: 2021-11-02
Attending: FAMILY MEDICINE
Payer: MEDICARE

## 2021-11-02 DIAGNOSIS — I10 ESSENTIAL HYPERTENSION: ICD-10-CM

## 2021-11-02 DIAGNOSIS — E11.9 CONTROLLED TYPE 2 DIABETES MELLITUS WITHOUT COMPLICATION, WITHOUT LONG-TERM CURRENT USE OF INSULIN (HCC): ICD-10-CM

## 2021-11-02 DIAGNOSIS — E78.2 MIXED HYPERLIPIDEMIA: ICD-10-CM

## 2021-11-02 PROCEDURE — 3051F HG A1C>EQUAL 7.0%<8.0%: CPT | Performed by: FAMILY MEDICINE

## 2021-11-02 PROCEDURE — 80053 COMPREHEN METABOLIC PANEL: CPT

## 2021-11-02 PROCEDURE — 83036 HEMOGLOBIN GLYCOSYLATED A1C: CPT

## 2021-11-09 ENCOUNTER — OFFICE VISIT (OUTPATIENT)
Dept: FAMILY MEDICINE CLINIC | Facility: CLINIC | Age: 71
End: 2021-11-09
Payer: MEDICARE

## 2021-11-09 VITALS
HEIGHT: 65 IN | DIASTOLIC BLOOD PRESSURE: 68 MMHG | BODY MASS INDEX: 27.82 KG/M2 | RESPIRATION RATE: 18 BRPM | SYSTOLIC BLOOD PRESSURE: 130 MMHG | WEIGHT: 167 LBS | TEMPERATURE: 98 F | HEART RATE: 80 BPM

## 2021-11-09 DIAGNOSIS — Z85.3 HISTORY OF LEFT BREAST CANCER: ICD-10-CM

## 2021-11-09 DIAGNOSIS — I10 ESSENTIAL HYPERTENSION: ICD-10-CM

## 2021-11-09 DIAGNOSIS — Z00.00 ENCOUNTER FOR ANNUAL HEALTH EXAMINATION: Primary | ICD-10-CM

## 2021-11-09 DIAGNOSIS — Z87.440 HISTORY OF RECURRENT UTIS: ICD-10-CM

## 2021-11-09 DIAGNOSIS — E78.2 MIXED HYPERLIPIDEMIA: ICD-10-CM

## 2021-11-09 DIAGNOSIS — Z23 NEED FOR VACCINATION: ICD-10-CM

## 2021-11-09 DIAGNOSIS — E11.9 CONTROLLED TYPE 2 DIABETES MELLITUS WITHOUT COMPLICATION, WITHOUT LONG-TERM CURRENT USE OF INSULIN (HCC): ICD-10-CM

## 2021-11-09 PROCEDURE — 99397 PER PM REEVAL EST PAT 65+ YR: CPT | Performed by: FAMILY MEDICINE

## 2021-11-09 PROCEDURE — 3078F DIAST BP <80 MM HG: CPT | Performed by: FAMILY MEDICINE

## 2021-11-09 PROCEDURE — G0008 ADMIN INFLUENZA VIRUS VAC: HCPCS | Performed by: FAMILY MEDICINE

## 2021-11-09 PROCEDURE — 3008F BODY MASS INDEX DOCD: CPT | Performed by: FAMILY MEDICINE

## 2021-11-09 PROCEDURE — 90662 IIV NO PRSV INCREASED AG IM: CPT | Performed by: FAMILY MEDICINE

## 2021-11-09 PROCEDURE — 3075F SYST BP GE 130 - 139MM HG: CPT | Performed by: FAMILY MEDICINE

## 2021-11-09 PROCEDURE — G0439 PPPS, SUBSEQ VISIT: HCPCS | Performed by: FAMILY MEDICINE

## 2021-11-09 PROCEDURE — 96160 PT-FOCUSED HLTH RISK ASSMT: CPT | Performed by: FAMILY MEDICINE

## 2021-11-09 RX ORDER — SULFAMETHOXAZOLE AND TRIMETHOPRIM 800; 160 MG/1; MG/1
1 TABLET ORAL 2 TIMES DAILY
Qty: 10 TABLET | Refills: 1 | Status: SHIPPED | OUTPATIENT
Start: 2021-11-09 | End: 2021-11-14

## 2021-11-09 NOTE — PROGRESS NOTES
HPI:   Raysa Wright is a 73year old female who presents for a MA (Medicare Advantage) Supervisit (Once per calendar year). Has been well. No new complaints  Hx of left breast cancer with one node involvement. Dx'd in 2002.   Had normal mammogra Patient Care Team:  Juan Cannon MD as PCP - General  Nai Caro PA-C as Physician Assistant [facility])    Patient Active Problem List:     Type 2 diabetes mellitus [facility])     Malignant neoplasm of female breast ([facility] [address]     Hyperlipide Oral Cap, Take 1 capsule by mouth daily. MEDICAL INFORMATION:   She  has a past medical history of Breast CA ([facility] [address] (2002), History of mammogram (7/31/12), and Leukocytoclastic vasculitis ([facility]     She  has a past surgical history that includes revi Result: Pass            Visual Acuity                           General Appearance:  Alert, cooperative, no distress, appears stated age   Head:  Normocephalic, without obvious abnormality, atraumatic   Eyes:  PERRL, conjunctiva/corneas clear, EOM's intact female who presents for a Medicare Assessment. PLAN SUMMARY:   Diagnoses and all orders for this visit:  1. Encounter for annual health examination  See above    2.  Controlled type 2 diabetes mellitus without complication, without long-term current use Procedure   Diabetes Screening      HbgA1C   Annually Lab Results   Component Value Date    A1C 7.0 (H) 11/02/2021    No flowsheet data found.     Fasting Blood Sugar (FSB)Annually Glucose (mg/dL)   Date Value   11/02/2021 147 (H)     GLUCOSE (mg/dL)   Date 12/13/2013 Please get once after your 65th birthday    Hepatitis B for Moderate/High Risk No vaccine history found Medium/high risk factors:   End-stage renal disease   Hemophiliacs who received Factor VIII or IX concentrates   Clients of institutions for

## 2021-11-09 NOTE — PATIENT INSTRUCTIONS
Rosario Robb's SCREENING SCHEDULE   Tests on this list are recommended by your physician but may not be covered, or covered at this frequency, by your insurer. Please check with your insurance carrier before scheduling to verify coverage.    PREVENT (CPT=77080) 11/07/2019      No recommendations at this time   Pap and Pelvic    Pap   Covered every 2 years for women at normal risk;  Annually if at high risk -  No recommendations at this time    Chlamydia Annually if high risk -  No recommendations at th Results   Component Value Date    K 4.7 11/02/2021         Creatinine   Annually Lab Results   Component Value Date    CREATSERUM 0.91 11/02/2021         BUN Annually Lab Results   Component Value Date    BUN 25 (H) 11/02/2021       Drug Serum Conc Annuall

## 2022-01-03 DIAGNOSIS — E78.2 MIXED HYPERLIPIDEMIA: ICD-10-CM

## 2022-01-03 DIAGNOSIS — E11.9 TYPE 2 DIABETES MELLITUS WITHOUT COMPLICATION, WITHOUT LONG-TERM CURRENT USE OF INSULIN (HCC): ICD-10-CM

## 2022-01-03 RX ORDER — ATORVASTATIN CALCIUM 10 MG/1
TABLET, FILM COATED ORAL
Qty: 90 TABLET | Refills: 3 | Status: SHIPPED | OUTPATIENT
Start: 2022-01-03

## 2022-01-03 RX ORDER — METFORMIN HYDROCHLORIDE 500 MG/1
TABLET, EXTENDED RELEASE ORAL
Qty: 360 TABLET | Refills: 3 | Status: SHIPPED | OUTPATIENT
Start: 2022-01-03

## 2022-01-17 RX ORDER — LISINOPRIL 10 MG/1
10 TABLET ORAL DAILY
Qty: 90 TABLET | Refills: 0 | Status: SHIPPED | OUTPATIENT
Start: 2022-01-17

## 2022-01-17 RX ORDER — LISINOPRIL 10 MG/1
10 TABLET ORAL DAILY
Qty: 90 TABLET | Refills: 0 | Status: SHIPPED | OUTPATIENT
Start: 2022-01-17 | End: 2022-01-17 | Stop reason: CLARIF

## 2022-04-18 RX ORDER — LISINOPRIL 10 MG/1
TABLET ORAL
Qty: 90 TABLET | Refills: 0 | Status: SHIPPED | OUTPATIENT
Start: 2022-04-18

## 2022-05-04 ENCOUNTER — IMMUNIZATION (OUTPATIENT)
Dept: LAB | Age: 72
End: 2022-05-04
Attending: EMERGENCY MEDICINE
Payer: MEDICARE

## 2022-05-04 DIAGNOSIS — Z23 NEED FOR VACCINATION: Primary | ICD-10-CM

## 2022-05-04 PROCEDURE — 0054A SARSCOV2 VAC 30MCG TRS SUCR: CPT

## 2022-05-17 ENCOUNTER — HOSPITAL ENCOUNTER (EMERGENCY)
Facility: HOSPITAL | Age: 72
Discharge: HOME | End: 2022-05-17
Attending: EMERGENCY MEDICINE
Payer: MEDICARE

## 2022-05-17 ENCOUNTER — TELEPHONE (OUTPATIENT)
Dept: FAMILY MEDICINE CLINIC | Facility: CLINIC | Age: 72
End: 2022-05-17

## 2022-05-17 ENCOUNTER — TELEPHONE (OUTPATIENT)
Dept: ORTHOPEDICS CLINIC | Facility: CLINIC | Age: 72
End: 2022-05-17

## 2022-05-17 ENCOUNTER — APPOINTMENT (OUTPATIENT)
Dept: GENERAL RADIOLOGY | Facility: HOSPITAL | Age: 72
End: 2022-05-17
Attending: EMERGENCY MEDICINE
Payer: MEDICARE

## 2022-05-17 VITALS
OXYGEN SATURATION: 96 % | DIASTOLIC BLOOD PRESSURE: 78 MMHG | HEART RATE: 80 BPM | RESPIRATION RATE: 18 BRPM | BODY MASS INDEX: 27.32 KG/M2 | HEIGHT: 66 IN | TEMPERATURE: 98 F | SYSTOLIC BLOOD PRESSURE: 165 MMHG | WEIGHT: 170 LBS

## 2022-05-17 DIAGNOSIS — M25.562 ACUTE PAIN OF LEFT KNEE: Primary | ICD-10-CM

## 2022-05-17 DIAGNOSIS — M25.462 EFFUSION OF LEFT KNEE: ICD-10-CM

## 2022-05-17 DIAGNOSIS — M25.462 EFFUSION, LEFT KNEE: Primary | ICD-10-CM

## 2022-05-17 PROCEDURE — 73560 X-RAY EXAM OF KNEE 1 OR 2: CPT | Performed by: EMERGENCY MEDICINE

## 2022-05-17 PROCEDURE — 99283 EMERGENCY DEPT VISIT LOW MDM: CPT

## 2022-05-17 NOTE — TELEPHONE ENCOUNTER
Pt calling requesting referral.    Pt states that she went to the emergency room for her left knee 5/17 and was instructed to follow up with her primary doctor. Pt was asking if she could receive a referral to a orthopedic physician to handle the situation with her knee. Please advise.

## 2022-05-17 NOTE — TELEPHONE ENCOUNTER
Last Xray results:  Date 5/17/22  XR KNEE (1 OR 2 VIEWS), LEFT (CPT=73560)  Narrative: PROCEDURE:  XR KNEE (1 OR 2 VIEWS), LEFT (CPT=73560)     COMPARISON:  None. INDICATIONS:  Left Knee Pain     PATIENT STATED HISTORY: (As transcribed by Technologist)  Patient shares she has left knee weakness and walks alot and felt like her left knee almst gave out today,         FINDINGS:  There is no fracture, subluxation or dislocation. There is mild spurring involving the superior and lower pole of the patella. There is mild marginal osteophyte formation along the medial knee joint compartment with mild medial knee joint   compartment narrowing. There is no subluxation or dislocation identified. There is a large suprapatellar joint effusion. Arterial vascular calcifications are seen in the popliteal artery and superficial femoral artery. Impression: CONCLUSION:    1. Large joint effusion and mild osteoarthritis. 2. No acute fracture or dislocation. Dictated by (CST): Michel Marroquin MD on 5/17/2022 at 1:55 PM       Finalized by (CST): Michel Marroquin MD on 5/17/2022 at 1:56 PM        Patients chart reviewed. No further imaging needed due to recent xrays. showing osteoarthritis.      Future Appointments   Date Time Provider Sammy Pagan   6/8/2022  9:40 AM Gerry Willson MD [facility]   6/9/2022  7:30 AM REF [facility] [place]   6/16/2022 10:30 AM Noah Angeles MD [facility]

## 2022-05-17 NOTE — ED INITIAL ASSESSMENT (HPI)
Pt reports L knee stiffness for the past week. Today knee gave out while going down step. Denies fall or other injury.

## 2022-05-18 NOTE — TELEPHONE ENCOUNTER
UNSTATED in her conversation after I placed referral she said she can't get in with that group for another 3 weeks. I cancelled appointment. She said she booked appointment (she called on her own) and is seeing  tomorrow.  Referral placed for that MD

## 2022-05-18 NOTE — TELEPHONE ENCOUNTER
Record shows ER visit yesterday w left knee pain?-laterality is ambiguous in notes. Reportedly felt like knee gave out w stepping off curb yesterday. Left knee xray showed large joint effusion. Given knee immobilizer w good relief, \"can follow up w orthopedics in one week, ibuprofen or tylenol for pain. \"    Pt asking for orthopedic referral-please advise-thanks!

## 2022-06-09 ENCOUNTER — LAB ENCOUNTER (OUTPATIENT)
Dept: LAB | Age: 72
End: 2022-06-09
Attending: FAMILY MEDICINE
Payer: MEDICARE

## 2022-06-09 DIAGNOSIS — E78.2 MIXED HYPERLIPIDEMIA: ICD-10-CM

## 2022-06-09 DIAGNOSIS — E11.9 CONTROLLED TYPE 2 DIABETES MELLITUS WITHOUT COMPLICATION, WITHOUT LONG-TERM CURRENT USE OF INSULIN (HCC): ICD-10-CM

## 2022-06-09 DIAGNOSIS — I10 ESSENTIAL HYPERTENSION: ICD-10-CM

## 2022-06-09 LAB
ALBUMIN SERPL-MCNC: 3.9 G/DL (ref 3.4–5)
ALBUMIN/GLOB SERPL: 1.4 {RATIO} (ref 1–2)
ALP LIVER SERPL-CCNC: 83 U/L
ALT SERPL-CCNC: 23 U/L
ANION GAP SERPL CALC-SCNC: 4 MMOL/L (ref 0–18)
AST SERPL-CCNC: 15 U/L (ref 15–37)
BASOPHILS # BLD AUTO: 0.08 X10(3) UL (ref 0–0.2)
BASOPHILS NFR BLD AUTO: 1.5 %
BILIRUB SERPL-MCNC: 0.6 MG/DL (ref 0.1–2)
BUN BLD-MCNC: 22 MG/DL (ref 7–18)
CALCIUM BLD-MCNC: 9.4 MG/DL (ref 8.5–10.1)
CHLORIDE SERPL-SCNC: 106 MMOL/L (ref 98–112)
CHOLEST SERPL-MCNC: 117 MG/DL (ref ?–200)
CO2 SERPL-SCNC: 30 MMOL/L (ref 21–32)
CREAT BLD-MCNC: 1.41 MG/DL
CREAT UR-SCNC: 49.8 MG/DL
EOSINOPHIL # BLD AUTO: 0.48 X10(3) UL (ref 0–0.7)
EOSINOPHIL NFR BLD AUTO: 8.8 %
ERYTHROCYTE [DISTWIDTH] IN BLOOD BY AUTOMATED COUNT: 12.3 %
EST. AVERAGE GLUCOSE BLD GHB EST-MCNC: 166 MG/DL (ref 68–126)
FASTING PATIENT LIPID ANSWER: YES
FASTING STATUS PATIENT QL REPORTED: YES
GLOBULIN PLAS-MCNC: 2.8 G/DL (ref 2.8–4.4)
GLUCOSE BLD-MCNC: 162 MG/DL (ref 70–99)
HBA1C MFR BLD: 7.4 % (ref ?–5.7)
HCT VFR BLD AUTO: 40.4 %
HDLC SERPL-MCNC: 37 MG/DL (ref 40–59)
HGB BLD-MCNC: 12.7 G/DL
IMM GRANULOCYTES # BLD AUTO: 0.01 X10(3) UL (ref 0–1)
IMM GRANULOCYTES NFR BLD: 0.2 %
LDLC SERPL CALC-MCNC: 43 MG/DL (ref ?–100)
LYMPHOCYTES # BLD AUTO: 1.78 X10(3) UL (ref 1–4)
LYMPHOCYTES NFR BLD AUTO: 32.7 %
MCH RBC QN AUTO: 29.1 PG (ref 26–34)
MCHC RBC AUTO-ENTMCNC: 31.4 G/DL (ref 31–37)
MCV RBC AUTO: 92.4 FL
MICROALBUMIN UR-MCNC: 0.56 MG/DL
MICROALBUMIN/CREAT 24H UR-RTO: 11.2 UG/MG (ref ?–30)
MONOCYTES # BLD AUTO: 0.36 X10(3) UL (ref 0.1–1)
MONOCYTES NFR BLD AUTO: 6.6 %
NEUTROPHILS # BLD AUTO: 2.73 X10 (3) UL (ref 1.5–7.7)
NEUTROPHILS # BLD AUTO: 2.73 X10(3) UL (ref 1.5–7.7)
NEUTROPHILS NFR BLD AUTO: 50.2 %
NONHDLC SERPL-MCNC: 80 MG/DL (ref ?–130)
OSMOLALITY SERPL CALC.SUM OF ELEC: 297 MOSM/KG (ref 275–295)
PLATELET # BLD AUTO: 258 10(3)UL (ref 150–450)
POTASSIUM SERPL-SCNC: 5.4 MMOL/L (ref 3.5–5.1)
PROT SERPL-MCNC: 6.7 G/DL (ref 6.4–8.2)
RBC # BLD AUTO: 4.37 X10(6)UL
SODIUM SERPL-SCNC: 140 MMOL/L (ref 136–145)
TRIGL SERPL-MCNC: 232 MG/DL (ref 30–149)
VLDLC SERPL CALC-MCNC: 32 MG/DL (ref 0–30)
WBC # BLD AUTO: 5.4 X10(3) UL (ref 4–11)

## 2022-06-09 PROCEDURE — 83036 HEMOGLOBIN GLYCOSYLATED A1C: CPT

## 2022-06-09 PROCEDURE — 82043 UR ALBUMIN QUANTITATIVE: CPT

## 2022-06-09 PROCEDURE — 82570 ASSAY OF URINE CREATININE: CPT

## 2022-06-09 PROCEDURE — 3061F NEG MICROALBUMINURIA REV: CPT | Performed by: FAMILY MEDICINE

## 2022-06-09 PROCEDURE — 80053 COMPREHEN METABOLIC PANEL: CPT

## 2022-06-09 PROCEDURE — 3051F HG A1C>EQUAL 7.0%<8.0%: CPT | Performed by: FAMILY MEDICINE

## 2022-06-09 PROCEDURE — 85025 COMPLETE CBC W/AUTO DIFF WBC: CPT

## 2022-06-09 PROCEDURE — 80061 LIPID PANEL: CPT

## 2022-06-15 ENCOUNTER — TELEPHONE (OUTPATIENT)
Dept: FAMILY MEDICINE CLINIC | Facility: CLINIC | Age: 72
End: 2022-06-15

## 2022-06-30 ENCOUNTER — LAB ENCOUNTER (OUTPATIENT)
Dept: LAB | Age: 72
End: 2022-06-30
Attending: FAMILY MEDICINE
Payer: MEDICARE

## 2022-06-30 ENCOUNTER — OFFICE VISIT (OUTPATIENT)
Dept: FAMILY MEDICINE CLINIC | Facility: CLINIC | Age: 72
End: 2022-06-30
Payer: COMMERCIAL

## 2022-06-30 VITALS
BODY MASS INDEX: 28.49 KG/M2 | HEIGHT: 65 IN | WEIGHT: 171 LBS | TEMPERATURE: 99 F | DIASTOLIC BLOOD PRESSURE: 72 MMHG | HEART RATE: 84 BPM | SYSTOLIC BLOOD PRESSURE: 128 MMHG | RESPIRATION RATE: 16 BRPM

## 2022-06-30 DIAGNOSIS — Z85.3 PERSONAL HISTORY OF BREAST CANCER: ICD-10-CM

## 2022-06-30 DIAGNOSIS — E11.9 TYPE 2 DIABETES MELLITUS WITHOUT COMPLICATION, WITHOUT LONG-TERM CURRENT USE OF INSULIN (HCC): Primary | ICD-10-CM

## 2022-06-30 DIAGNOSIS — Z23 NEED FOR VACCINATION: ICD-10-CM

## 2022-06-30 DIAGNOSIS — Z80.41 FAMILY HISTORY OF OVARIAN CANCER: ICD-10-CM

## 2022-06-30 DIAGNOSIS — E78.2 MIXED HYPERLIPIDEMIA: ICD-10-CM

## 2022-06-30 DIAGNOSIS — E87.5 HYPERKALEMIA: ICD-10-CM

## 2022-06-30 DIAGNOSIS — N39.0 RECURRENT UTI: ICD-10-CM

## 2022-06-30 LAB
ANION GAP SERPL CALC-SCNC: 7 MMOL/L (ref 0–18)
BUN BLD-MCNC: 25 MG/DL (ref 7–18)
CALCIUM BLD-MCNC: 10.4 MG/DL (ref 8.5–10.1)
CHLORIDE SERPL-SCNC: 106 MMOL/L (ref 98–112)
CO2 SERPL-SCNC: 27 MMOL/L (ref 21–32)
CREAT BLD-MCNC: 1.42 MG/DL
FASTING STATUS PATIENT QL REPORTED: NO
GLUCOSE BLD-MCNC: 133 MG/DL (ref 70–99)
OSMOLALITY SERPL CALC.SUM OF ELEC: 296 MOSM/KG (ref 275–295)
POTASSIUM SERPL-SCNC: 4.8 MMOL/L (ref 3.5–5.1)
SODIUM SERPL-SCNC: 140 MMOL/L (ref 136–145)

## 2022-06-30 PROCEDURE — 3074F SYST BP LT 130 MM HG: CPT | Performed by: FAMILY MEDICINE

## 2022-06-30 PROCEDURE — 80048 BASIC METABOLIC PNL TOTAL CA: CPT

## 2022-06-30 PROCEDURE — 90715 TDAP VACCINE 7 YRS/> IM: CPT | Performed by: FAMILY MEDICINE

## 2022-06-30 PROCEDURE — 3008F BODY MASS INDEX DOCD: CPT | Performed by: FAMILY MEDICINE

## 2022-06-30 PROCEDURE — 99214 OFFICE O/P EST MOD 30 MIN: CPT | Performed by: FAMILY MEDICINE

## 2022-06-30 PROCEDURE — 3078F DIAST BP <80 MM HG: CPT | Performed by: FAMILY MEDICINE

## 2022-06-30 PROCEDURE — 90471 IMMUNIZATION ADMIN: CPT | Performed by: FAMILY MEDICINE

## 2022-06-30 RX ORDER — SULFAMETHOXAZOLE AND TRIMETHOPRIM 800; 160 MG/1; MG/1
1 TABLET ORAL 2 TIMES DAILY
Qty: 10 TABLET | Refills: 1 | Status: SHIPPED | OUTPATIENT
Start: 2022-06-30 | End: 2022-07-05

## 2022-07-01 DIAGNOSIS — R79.89 ELEVATED SERUM CREATININE: Primary | ICD-10-CM

## 2022-07-14 ENCOUNTER — HOSPITAL ENCOUNTER (OUTPATIENT)
Dept: MAMMOGRAPHY | Age: 72
Discharge: HOME | End: 2022-07-14
Attending: FAMILY MEDICINE
Payer: MEDICARE

## 2022-07-14 DIAGNOSIS — Z85.3 PERSONAL HISTORY OF BREAST CANCER: ICD-10-CM

## 2022-07-14 PROCEDURE — 77067 SCR MAMMO BI INCL CAD: CPT | Performed by: FAMILY MEDICINE

## 2022-07-14 PROCEDURE — 77063 BREAST TOMOSYNTHESIS BI: CPT | Performed by: FAMILY MEDICINE

## 2022-07-18 RX ORDER — LISINOPRIL 10 MG/1
TABLET ORAL
Qty: 90 TABLET | Refills: 1 | Status: SHIPPED | OUTPATIENT
Start: 2022-07-18

## 2022-08-02 ENCOUNTER — TELEPHONE (OUTPATIENT)
Dept: FAMILY MEDICINE CLINIC | Facility: CLINIC | Age: 72
End: 2022-08-02

## 2022-08-04 ENCOUNTER — LAB ENCOUNTER (OUTPATIENT)
Dept: LAB | Age: 72
End: 2022-08-04
Attending: FAMILY MEDICINE
Payer: MEDICARE

## 2022-08-04 DIAGNOSIS — R79.89 ELEVATED SERUM CREATININE: ICD-10-CM

## 2022-08-04 LAB
ANION GAP SERPL CALC-SCNC: 7 MMOL/L (ref 0–18)
BUN BLD-MCNC: 23 MG/DL (ref 7–18)
CALCIUM BLD-MCNC: 9.5 MG/DL (ref 8.5–10.1)
CHLORIDE SERPL-SCNC: 106 MMOL/L (ref 98–112)
CO2 SERPL-SCNC: 25 MMOL/L (ref 21–32)
CREAT BLD-MCNC: 1.18 MG/DL
FASTING STATUS PATIENT QL REPORTED: YES
GFR SERPLBLD BASED ON 1.73 SQ M-ARVRAT: 50 ML/MIN/1.73M2 (ref 60–?)
GLUCOSE BLD-MCNC: 131 MG/DL (ref 70–99)
OSMOLALITY SERPL CALC.SUM OF ELEC: 291 MOSM/KG (ref 275–295)
POTASSIUM SERPL-SCNC: 4.8 MMOL/L (ref 3.5–5.1)
SODIUM SERPL-SCNC: 138 MMOL/L (ref 136–145)

## 2022-08-04 PROCEDURE — 80048 BASIC METABOLIC PNL TOTAL CA: CPT

## 2022-08-09 ENCOUNTER — MED REC SCAN ONLY (OUTPATIENT)
Dept: FAMILY MEDICINE CLINIC | Facility: CLINIC | Age: 72
End: 2022-08-09

## 2022-09-20 ENCOUNTER — IMMUNIZATION (OUTPATIENT)
Dept: LAB | Age: 72
End: 2022-09-20
Attending: EMERGENCY MEDICINE

## 2022-09-20 DIAGNOSIS — Z23 NEED FOR VACCINATION: Primary | ICD-10-CM

## 2022-09-20 PROCEDURE — 0124A SARSCOV2 VAC BVL 30MCG/0.3ML: CPT

## 2022-09-26 ENCOUNTER — PATIENT MESSAGE (OUTPATIENT)
Dept: FAMILY MEDICINE CLINIC | Facility: CLINIC | Age: 72
End: 2022-09-26

## 2022-09-26 DIAGNOSIS — E78.2 MIXED HYPERLIPIDEMIA: ICD-10-CM

## 2022-09-26 DIAGNOSIS — E11.9 TYPE 2 DIABETES MELLITUS WITHOUT COMPLICATION, WITHOUT LONG-TERM CURRENT USE OF INSULIN (HCC): Primary | ICD-10-CM

## 2022-09-26 NOTE — TELEPHONE ENCOUNTER
From: Miko Moses  To: Jocelyn Guadarrama MD  Sent: 9/26/2022 9:57 AM CDT  Subject: Order for lab work    I need an order for HgA1C,CMP,Lipids and urine for albumin before my next appt. Please let me know when the order is in the system so I can schedule blood draw.  Thanks Edilberto Bronson

## 2022-10-10 ENCOUNTER — IMMUNIZATION (OUTPATIENT)
Dept: FAMILY MEDICINE CLINIC | Facility: CLINIC | Age: 72
End: 2022-10-10
Payer: MEDICARE

## 2022-10-10 DIAGNOSIS — Z23 NEED FOR VACCINATION: Primary | ICD-10-CM

## 2022-10-10 PROCEDURE — 90662 IIV NO PRSV INCREASED AG IM: CPT | Performed by: FAMILY MEDICINE

## 2022-10-10 PROCEDURE — G0008 ADMIN INFLUENZA VIRUS VAC: HCPCS | Performed by: FAMILY MEDICINE

## 2022-10-21 ENCOUNTER — MED REC SCAN ONLY (OUTPATIENT)
Dept: FAMILY MEDICINE CLINIC | Facility: CLINIC | Age: 72
End: 2022-10-21

## 2022-11-03 ENCOUNTER — LAB ENCOUNTER (OUTPATIENT)
Dept: LAB | Age: 72
End: 2022-11-03
Attending: FAMILY MEDICINE
Payer: MEDICARE

## 2022-11-03 DIAGNOSIS — E78.2 MIXED HYPERLIPIDEMIA: ICD-10-CM

## 2022-11-03 DIAGNOSIS — E11.9 TYPE 2 DIABETES MELLITUS WITHOUT COMPLICATION, WITHOUT LONG-TERM CURRENT USE OF INSULIN (HCC): ICD-10-CM

## 2022-11-03 LAB
ALBUMIN SERPL-MCNC: 3.9 G/DL (ref 3.4–5)
ALBUMIN/GLOB SERPL: 1.4 {RATIO} (ref 1–2)
ALP LIVER SERPL-CCNC: 72 U/L
ALT SERPL-CCNC: 27 U/L
ANION GAP SERPL CALC-SCNC: 8 MMOL/L (ref 0–18)
AST SERPL-CCNC: 20 U/L (ref 15–37)
BILIRUB SERPL-MCNC: 0.3 MG/DL (ref 0.1–2)
BUN BLD-MCNC: 18 MG/DL (ref 7–18)
BUN/CREAT SERPL: 17 (ref 10–20)
CALCIUM BLD-MCNC: 9.4 MG/DL (ref 8.5–10.1)
CHLORIDE SERPL-SCNC: 108 MMOL/L (ref 98–112)
CHOLEST SERPL-MCNC: 99 MG/DL (ref ?–200)
CO2 SERPL-SCNC: 27 MMOL/L (ref 21–32)
CREAT BLD-MCNC: 1.06 MG/DL
CREAT UR-SCNC: 91 MG/DL
EST. AVERAGE GLUCOSE BLD GHB EST-MCNC: 143 MG/DL (ref 68–126)
FASTING PATIENT LIPID ANSWER: YES
FASTING STATUS PATIENT QL REPORTED: YES
GFR SERPLBLD BASED ON 1.73 SQ M-ARVRAT: 57 ML/MIN/1.73M2 (ref 60–?)
GLOBULIN PLAS-MCNC: 2.8 G/DL (ref 2.8–4.4)
GLUCOSE BLD-MCNC: 113 MG/DL (ref 70–99)
HBA1C MFR BLD: 6.6 % (ref ?–5.7)
HDLC SERPL-MCNC: 38 MG/DL (ref 40–59)
LDLC SERPL CALC-MCNC: 32 MG/DL (ref ?–100)
MICROALBUMIN UR-MCNC: <0.5 MG/DL
NONHDLC SERPL-MCNC: 61 MG/DL (ref ?–130)
OSMOLALITY SERPL CALC.SUM OF ELEC: 299 MOSM/KG (ref 275–295)
POTASSIUM SERPL-SCNC: 4.7 MMOL/L (ref 3.5–5.1)
PROT SERPL-MCNC: 6.7 G/DL (ref 6.4–8.2)
SODIUM SERPL-SCNC: 143 MMOL/L (ref 136–145)
TRIGL SERPL-MCNC: 174 MG/DL (ref 30–149)
VLDLC SERPL CALC-MCNC: 23 MG/DL (ref 0–30)

## 2022-11-03 PROCEDURE — 80053 COMPREHEN METABOLIC PANEL: CPT

## 2022-11-03 PROCEDURE — 80061 LIPID PANEL: CPT

## 2022-11-03 PROCEDURE — 3044F HG A1C LEVEL LT 7.0%: CPT | Performed by: FAMILY MEDICINE

## 2022-11-03 PROCEDURE — 82043 UR ALBUMIN QUANTITATIVE: CPT

## 2022-11-03 PROCEDURE — 83036 HEMOGLOBIN GLYCOSYLATED A1C: CPT

## 2022-11-03 PROCEDURE — 82570 ASSAY OF URINE CREATININE: CPT

## 2022-11-03 PROCEDURE — 3061F NEG MICROALBUMINURIA REV: CPT | Performed by: FAMILY MEDICINE

## 2022-11-10 ENCOUNTER — OFFICE VISIT (OUTPATIENT)
Dept: FAMILY MEDICINE CLINIC | Facility: CLINIC | Age: 72
End: 2022-11-10
Payer: MEDICARE

## 2022-11-10 VITALS
HEART RATE: 80 BPM | HEIGHT: 65 IN | DIASTOLIC BLOOD PRESSURE: 60 MMHG | TEMPERATURE: 98 F | WEIGHT: 172 LBS | BODY MASS INDEX: 28.66 KG/M2 | SYSTOLIC BLOOD PRESSURE: 138 MMHG | RESPIRATION RATE: 16 BRPM

## 2022-11-10 DIAGNOSIS — I10 ESSENTIAL HYPERTENSION: ICD-10-CM

## 2022-11-10 DIAGNOSIS — Z85.3 HISTORY OF LEFT BREAST CANCER: ICD-10-CM

## 2022-11-10 DIAGNOSIS — Z00.00 ENCOUNTER FOR ANNUAL HEALTH EXAMINATION: Primary | ICD-10-CM

## 2022-11-10 DIAGNOSIS — E11.9 TYPE 2 DIABETES MELLITUS WITHOUT COMPLICATION, WITHOUT LONG-TERM CURRENT USE OF INSULIN (HCC): ICD-10-CM

## 2022-11-10 DIAGNOSIS — L20.84 INTRINSIC ECZEMA: ICD-10-CM

## 2022-11-10 DIAGNOSIS — Z12.4 SCREENING FOR CERVICAL CANCER: ICD-10-CM

## 2022-11-10 DIAGNOSIS — E78.2 MIXED HYPERLIPIDEMIA: ICD-10-CM

## 2022-11-10 DIAGNOSIS — Z01.419 WELL WOMAN EXAM WITH ROUTINE GYNECOLOGICAL EXAM: ICD-10-CM

## 2022-11-10 PROCEDURE — 96160 PT-FOCUSED HLTH RISK ASSMT: CPT | Performed by: FAMILY MEDICINE

## 2022-11-10 PROCEDURE — 3075F SYST BP GE 130 - 139MM HG: CPT | Performed by: FAMILY MEDICINE

## 2022-11-10 PROCEDURE — 1126F AMNT PAIN NOTED NONE PRSNT: CPT | Performed by: FAMILY MEDICINE

## 2022-11-10 PROCEDURE — G0439 PPPS, SUBSEQ VISIT: HCPCS | Performed by: FAMILY MEDICINE

## 2022-11-10 PROCEDURE — 3078F DIAST BP <80 MM HG: CPT | Performed by: FAMILY MEDICINE

## 2022-11-10 PROCEDURE — 88175 CYTOPATH C/V AUTO FLUID REDO: CPT | Performed by: FAMILY MEDICINE

## 2022-11-10 PROCEDURE — 99397 PER PM REEVAL EST PAT 65+ YR: CPT | Performed by: FAMILY MEDICINE

## 2022-11-10 PROCEDURE — 3008F BODY MASS INDEX DOCD: CPT | Performed by: FAMILY MEDICINE

## 2022-11-10 RX ORDER — CLOBETASOL PROPIONATE 0.46 MG/ML
SOLUTION TOPICAL
Qty: 50 ML | Refills: 1 | Status: CANCELLED | OUTPATIENT
Start: 2022-11-10

## 2022-11-10 RX ORDER — CLOBETASOL PROPIONATE 0.46 MG/ML
SOLUTION TOPICAL
Qty: 50 ML | Refills: 1 | Status: SHIPPED | OUTPATIENT
Start: 2022-11-10

## 2022-12-28 DIAGNOSIS — E78.2 MIXED HYPERLIPIDEMIA: ICD-10-CM

## 2022-12-28 DIAGNOSIS — E11.9 TYPE 2 DIABETES MELLITUS WITHOUT COMPLICATION, WITHOUT LONG-TERM CURRENT USE OF INSULIN (HCC): ICD-10-CM

## 2022-12-28 RX ORDER — ATORVASTATIN CALCIUM 10 MG/1
TABLET, FILM COATED ORAL
Qty: 90 TABLET | Refills: 1 | Status: SHIPPED | OUTPATIENT
Start: 2022-12-28

## 2022-12-28 RX ORDER — METFORMIN HYDROCHLORIDE 500 MG/1
TABLET, EXTENDED RELEASE ORAL
Qty: 360 TABLET | Refills: 1 | Status: SHIPPED | OUTPATIENT
Start: 2022-12-28

## 2023-01-13 RX ORDER — LISINOPRIL 10 MG/1
TABLET ORAL
Qty: 90 TABLET | Refills: 0 | Status: SHIPPED | OUTPATIENT
Start: 2023-01-13

## 2023-04-13 RX ORDER — LISINOPRIL 10 MG/1
TABLET ORAL
Qty: 90 TABLET | Refills: 3 | Status: SHIPPED | OUTPATIENT
Start: 2023-04-13

## 2023-05-25 ENCOUNTER — LAB ENCOUNTER (OUTPATIENT)
Dept: LAB | Age: 73
End: 2023-05-25
Attending: FAMILY MEDICINE
Payer: MEDICARE

## 2023-05-25 DIAGNOSIS — E11.9 TYPE 2 DIABETES MELLITUS WITHOUT COMPLICATION, WITHOUT LONG-TERM CURRENT USE OF INSULIN (HCC): ICD-10-CM

## 2023-05-25 DIAGNOSIS — L20.84 INTRINSIC ECZEMA: ICD-10-CM

## 2023-05-25 DIAGNOSIS — E78.2 MIXED HYPERLIPIDEMIA: ICD-10-CM

## 2023-05-25 DIAGNOSIS — I10 ESSENTIAL HYPERTENSION: ICD-10-CM

## 2023-05-25 LAB
ALBUMIN SERPL-MCNC: 3.9 G/DL (ref 3.4–5)
ALBUMIN/GLOB SERPL: 1.3 {RATIO} (ref 1–2)
ALP LIVER SERPL-CCNC: 97 U/L
ALT SERPL-CCNC: 34 U/L
ANION GAP SERPL CALC-SCNC: 4 MMOL/L (ref 0–18)
AST SERPL-CCNC: 30 U/L (ref 15–37)
BASOPHILS # BLD AUTO: 0.09 X10(3) UL (ref 0–0.2)
BASOPHILS NFR BLD AUTO: 1.5 %
BILIRUB SERPL-MCNC: 0.3 MG/DL (ref 0.1–2)
BUN BLD-MCNC: 29 MG/DL (ref 7–18)
CALCIUM BLD-MCNC: 9.6 MG/DL (ref 8.5–10.1)
CHLORIDE SERPL-SCNC: 108 MMOL/L (ref 98–112)
CHOLEST SERPL-MCNC: 88 MG/DL (ref ?–200)
CO2 SERPL-SCNC: 25 MMOL/L (ref 21–32)
CREAT BLD-MCNC: 1.15 MG/DL
CREAT UR-SCNC: 105 MG/DL
EOSINOPHIL # BLD AUTO: 0.51 X10(3) UL (ref 0–0.7)
EOSINOPHIL NFR BLD AUTO: 8.5 %
ERYTHROCYTE [DISTWIDTH] IN BLOOD BY AUTOMATED COUNT: 12.4 %
EST. AVERAGE GLUCOSE BLD GHB EST-MCNC: 151 MG/DL (ref 68–126)
FASTING PATIENT LIPID ANSWER: YES
FASTING STATUS PATIENT QL REPORTED: YES
GFR SERPLBLD BASED ON 1.73 SQ M-ARVRAT: 51 ML/MIN/1.73M2 (ref 60–?)
GLOBULIN PLAS-MCNC: 3 G/DL (ref 2.8–4.4)
GLUCOSE BLD-MCNC: 129 MG/DL (ref 70–99)
HBA1C MFR BLD: 6.9 % (ref ?–5.7)
HCT VFR BLD AUTO: 39 %
HDLC SERPL-MCNC: 34 MG/DL (ref 40–59)
HGB BLD-MCNC: 12.2 G/DL
IMM GRANULOCYTES # BLD AUTO: 0.02 X10(3) UL (ref 0–1)
IMM GRANULOCYTES NFR BLD: 0.3 %
LDLC SERPL CALC-MCNC: 20 MG/DL (ref ?–100)
LYMPHOCYTES # BLD AUTO: 1.53 X10(3) UL (ref 1–4)
LYMPHOCYTES NFR BLD AUTO: 25.4 %
MCH RBC QN AUTO: 28.4 PG (ref 26–34)
MCHC RBC AUTO-ENTMCNC: 31.3 G/DL (ref 31–37)
MCV RBC AUTO: 90.7 FL
MICROALBUMIN UR-MCNC: 0.86 MG/DL
MICROALBUMIN/CREAT 24H UR-RTO: 8.2 UG/MG (ref ?–30)
MONOCYTES # BLD AUTO: 0.35 X10(3) UL (ref 0.1–1)
MONOCYTES NFR BLD AUTO: 5.8 %
NEUTROPHILS # BLD AUTO: 3.52 X10 (3) UL (ref 1.5–7.7)
NEUTROPHILS # BLD AUTO: 3.52 X10(3) UL (ref 1.5–7.7)
NEUTROPHILS NFR BLD AUTO: 58.5 %
NONHDLC SERPL-MCNC: 54 MG/DL (ref ?–130)
OSMOLALITY SERPL CALC.SUM OF ELEC: 292 MOSM/KG (ref 275–295)
PLATELET # BLD AUTO: 272 10(3)UL (ref 150–450)
POTASSIUM SERPL-SCNC: 5.3 MMOL/L (ref 3.5–5.1)
PROT SERPL-MCNC: 6.9 G/DL (ref 6.4–8.2)
RBC # BLD AUTO: 4.3 X10(6)UL
SODIUM SERPL-SCNC: 137 MMOL/L (ref 136–145)
TRIGL SERPL-MCNC: 220 MG/DL (ref 30–149)
VLDLC SERPL CALC-MCNC: 28 MG/DL (ref 0–30)
WBC # BLD AUTO: 6 X10(3) UL (ref 4–11)

## 2023-05-25 PROCEDURE — 80053 COMPREHEN METABOLIC PANEL: CPT

## 2023-05-25 PROCEDURE — 82043 UR ALBUMIN QUANTITATIVE: CPT

## 2023-05-25 PROCEDURE — 3061F NEG MICROALBUMINURIA REV: CPT | Performed by: FAMILY MEDICINE

## 2023-05-25 PROCEDURE — 85025 COMPLETE CBC W/AUTO DIFF WBC: CPT

## 2023-05-25 PROCEDURE — 83036 HEMOGLOBIN GLYCOSYLATED A1C: CPT

## 2023-05-25 PROCEDURE — 3044F HG A1C LEVEL LT 7.0%: CPT | Performed by: FAMILY MEDICINE

## 2023-05-25 PROCEDURE — 80061 LIPID PANEL: CPT

## 2023-05-25 PROCEDURE — 82570 ASSAY OF URINE CREATININE: CPT

## 2023-06-05 DIAGNOSIS — E11.9 TYPE 2 DIABETES MELLITUS WITHOUT COMPLICATION, WITHOUT LONG-TERM CURRENT USE OF INSULIN (HCC): ICD-10-CM

## 2023-06-05 RX ORDER — SITAGLIPTIN 100 MG/1
TABLET, FILM COATED ORAL
Qty: 90 TABLET | Refills: 0 | Status: SHIPPED | OUTPATIENT
Start: 2023-06-05

## 2023-06-08 ENCOUNTER — OFFICE VISIT (OUTPATIENT)
Dept: FAMILY MEDICINE CLINIC | Facility: CLINIC | Age: 73
End: 2023-06-08
Payer: MEDICARE

## 2023-06-08 VITALS
HEART RATE: 80 BPM | DIASTOLIC BLOOD PRESSURE: 70 MMHG | WEIGHT: 174 LBS | SYSTOLIC BLOOD PRESSURE: 138 MMHG | RESPIRATION RATE: 16 BRPM | HEIGHT: 65 IN | BODY MASS INDEX: 28.99 KG/M2 | TEMPERATURE: 98 F

## 2023-06-08 DIAGNOSIS — E78.2 MIXED HYPERLIPIDEMIA: ICD-10-CM

## 2023-06-08 DIAGNOSIS — Z12.31 ENCOUNTER FOR SCREENING MAMMOGRAM FOR MALIGNANT NEOPLASM OF BREAST: ICD-10-CM

## 2023-06-08 DIAGNOSIS — E11.9 TYPE 2 DIABETES MELLITUS WITHOUT COMPLICATION, WITHOUT LONG-TERM CURRENT USE OF INSULIN (HCC): Primary | ICD-10-CM

## 2023-06-08 DIAGNOSIS — E87.5 HYPERKALEMIA: ICD-10-CM

## 2023-06-08 DIAGNOSIS — I10 BENIGN ESSENTIAL HYPERTENSION: ICD-10-CM

## 2023-06-08 DIAGNOSIS — L20.84 INTRINSIC ECZEMA: ICD-10-CM

## 2023-06-08 DIAGNOSIS — N30.90 CYSTITIS: ICD-10-CM

## 2023-06-08 PROCEDURE — 3075F SYST BP GE 130 - 139MM HG: CPT | Performed by: FAMILY MEDICINE

## 2023-06-08 PROCEDURE — 1159F MED LIST DOCD IN RCRD: CPT | Performed by: FAMILY MEDICINE

## 2023-06-08 PROCEDURE — 99214 OFFICE O/P EST MOD 30 MIN: CPT | Performed by: FAMILY MEDICINE

## 2023-06-08 PROCEDURE — 1170F FXNL STATUS ASSESSED: CPT | Performed by: FAMILY MEDICINE

## 2023-06-08 PROCEDURE — 3008F BODY MASS INDEX DOCD: CPT | Performed by: FAMILY MEDICINE

## 2023-06-08 PROCEDURE — 3078F DIAST BP <80 MM HG: CPT | Performed by: FAMILY MEDICINE

## 2023-06-08 RX ORDER — CLOBETASOL PROPIONATE 0.46 MG/ML
SOLUTION TOPICAL
Qty: 50 ML | Refills: 1 | Status: SHIPPED | OUTPATIENT
Start: 2023-06-08

## 2023-06-08 RX ORDER — SULFAMETHOXAZOLE AND TRIMETHOPRIM 800; 160 MG/1; MG/1
1 TABLET ORAL 2 TIMES DAILY
Qty: 10 TABLET | Refills: 0 | Status: SHIPPED | OUTPATIENT
Start: 2023-06-08 | End: 2023-06-13

## 2023-06-26 DIAGNOSIS — E11.9 TYPE 2 DIABETES MELLITUS WITHOUT COMPLICATION, WITHOUT LONG-TERM CURRENT USE OF INSULIN (HCC): ICD-10-CM

## 2023-06-26 DIAGNOSIS — E78.2 MIXED HYPERLIPIDEMIA: ICD-10-CM

## 2023-06-26 RX ORDER — ATORVASTATIN CALCIUM 10 MG/1
TABLET, FILM COATED ORAL
Qty: 90 TABLET | Refills: 1 | Status: SHIPPED | OUTPATIENT
Start: 2023-06-26

## 2023-06-26 RX ORDER — METFORMIN HYDROCHLORIDE 500 MG/1
TABLET, EXTENDED RELEASE ORAL
Qty: 360 TABLET | Refills: 1 | Status: SHIPPED | OUTPATIENT
Start: 2023-06-26

## 2023-07-26 ENCOUNTER — HOSPITAL ENCOUNTER (OUTPATIENT)
Dept: MAMMOGRAPHY | Age: 73
Discharge: HOME | End: 2023-07-26
Attending: FAMILY MEDICINE
Payer: MEDICARE

## 2023-07-26 DIAGNOSIS — Z12.31 ENCOUNTER FOR SCREENING MAMMOGRAM FOR MALIGNANT NEOPLASM OF BREAST: ICD-10-CM

## 2023-07-26 PROCEDURE — 77067 SCR MAMMO BI INCL CAD: CPT | Performed by: FAMILY MEDICINE

## 2023-07-26 PROCEDURE — 77063 BREAST TOMOSYNTHESIS BI: CPT | Performed by: FAMILY MEDICINE

## 2023-08-04 ENCOUNTER — MED REC SCAN ONLY (OUTPATIENT)
Dept: FAMILY MEDICINE CLINIC | Facility: CLINIC | Age: 73
End: 2023-08-04

## 2023-10-13 ENCOUNTER — IMMUNIZATION (OUTPATIENT)
Dept: FAMILY MEDICINE CLINIC | Facility: CLINIC | Age: 73
End: 2023-10-13
Payer: MEDICARE

## 2023-10-13 DIAGNOSIS — Z23 NEED FOR VACCINATION: Primary | ICD-10-CM

## 2023-11-07 ENCOUNTER — IMMUNIZATION (OUTPATIENT)
Dept: LAB | Age: 73
End: 2023-11-07
Attending: EMERGENCY MEDICINE
Payer: MEDICARE

## 2023-11-07 DIAGNOSIS — Z23 NEED FOR VACCINATION: Primary | ICD-10-CM

## 2023-11-07 PROCEDURE — 90480 ADMN SARSCOV2 VAC 1/ONLY CMP: CPT

## 2023-12-04 ENCOUNTER — LAB ENCOUNTER (OUTPATIENT)
Dept: LAB | Age: 73
End: 2023-12-04
Attending: FAMILY MEDICINE
Payer: MEDICARE

## 2023-12-04 DIAGNOSIS — E11.9 TYPE 2 DIABETES MELLITUS WITHOUT COMPLICATION, WITHOUT LONG-TERM CURRENT USE OF INSULIN (HCC): ICD-10-CM

## 2023-12-04 DIAGNOSIS — E87.5 HYPERKALEMIA: ICD-10-CM

## 2023-12-04 DIAGNOSIS — E78.2 MIXED HYPERLIPIDEMIA: ICD-10-CM

## 2023-12-04 DIAGNOSIS — I10 BENIGN ESSENTIAL HYPERTENSION: ICD-10-CM

## 2023-12-04 LAB
ALBUMIN SERPL-MCNC: 4 G/DL (ref 3.4–5)
ALBUMIN/GLOB SERPL: 1.3 {RATIO} (ref 1–2)
ALP LIVER SERPL-CCNC: 107 U/L
ALT SERPL-CCNC: 31 U/L
ANION GAP SERPL CALC-SCNC: 6 MMOL/L (ref 0–18)
AST SERPL-CCNC: 20 U/L (ref 15–37)
BILIRUB SERPL-MCNC: 0.3 MG/DL (ref 0.1–2)
BUN BLD-MCNC: 23 MG/DL (ref 9–23)
CALCIUM BLD-MCNC: 9.6 MG/DL (ref 8.5–10.1)
CHLORIDE SERPL-SCNC: 105 MMOL/L (ref 98–112)
CHOLEST SERPL-MCNC: 122 MG/DL (ref ?–200)
CO2 SERPL-SCNC: 28 MMOL/L (ref 21–32)
CREAT BLD-MCNC: 1.22 MG/DL
CREAT UR-SCNC: 57.8 MG/DL
EGFRCR SERPLBLD CKD-EPI 2021: 47 ML/MIN/1.73M2 (ref 60–?)
EST. AVERAGE GLUCOSE BLD GHB EST-MCNC: 151 MG/DL (ref 68–126)
FASTING PATIENT LIPID ANSWER: YES
FASTING STATUS PATIENT QL REPORTED: YES
GLOBULIN PLAS-MCNC: 3.2 G/DL (ref 2.8–4.4)
GLUCOSE BLD-MCNC: 116 MG/DL (ref 70–99)
HBA1C MFR BLD: 6.9 % (ref ?–5.7)
HDLC SERPL-MCNC: 37 MG/DL (ref 40–59)
LDLC SERPL CALC-MCNC: 53 MG/DL (ref ?–100)
MICROALBUMIN UR-MCNC: 0.59 MG/DL
MICROALBUMIN/CREAT 24H UR-RTO: 10.2 UG/MG (ref ?–30)
NONHDLC SERPL-MCNC: 85 MG/DL (ref ?–130)
OSMOLALITY SERPL CALC.SUM OF ELEC: 293 MOSM/KG (ref 275–295)
POTASSIUM SERPL-SCNC: 4.4 MMOL/L (ref 3.5–5.1)
PROT SERPL-MCNC: 7.2 G/DL (ref 6.4–8.2)
SODIUM SERPL-SCNC: 139 MMOL/L (ref 136–145)
TRIGL SERPL-MCNC: 196 MG/DL (ref 30–149)
VLDLC SERPL CALC-MCNC: 28 MG/DL (ref 0–30)

## 2023-12-04 PROCEDURE — 80061 LIPID PANEL: CPT

## 2023-12-04 PROCEDURE — 84439 ASSAY OF FREE THYROXINE: CPT

## 2023-12-04 PROCEDURE — 82570 ASSAY OF URINE CREATININE: CPT

## 2023-12-04 PROCEDURE — 84443 ASSAY THYROID STIM HORMONE: CPT

## 2023-12-04 PROCEDURE — 3061F NEG MICROALBUMINURIA REV: CPT | Performed by: STUDENT IN AN ORGANIZED HEALTH CARE EDUCATION/TRAINING PROGRAM

## 2023-12-04 PROCEDURE — 83036 HEMOGLOBIN GLYCOSYLATED A1C: CPT

## 2023-12-04 PROCEDURE — 82043 UR ALBUMIN QUANTITATIVE: CPT

## 2023-12-04 PROCEDURE — 3044F HG A1C LEVEL LT 7.0%: CPT | Performed by: STUDENT IN AN ORGANIZED HEALTH CARE EDUCATION/TRAINING PROGRAM

## 2023-12-04 PROCEDURE — 80053 COMPREHEN METABOLIC PANEL: CPT

## 2023-12-04 RX ORDER — SITAGLIPTIN 100 MG/1
TABLET, FILM COATED ORAL
Qty: 90 TABLET | Refills: 0 | Status: SHIPPED | OUTPATIENT
Start: 2023-12-04 | End: 2023-12-07

## 2023-12-04 NOTE — TELEPHONE ENCOUNTER
Did not pass protocol  Last office visit 6/8  Last refill was: 9/6, 90 tabs  Next appointment: 12/7    Please sign pended medication if appropriate

## 2023-12-07 ENCOUNTER — OFFICE VISIT (OUTPATIENT)
Dept: FAMILY MEDICINE CLINIC | Facility: CLINIC | Age: 73
End: 2023-12-07
Payer: MEDICARE

## 2023-12-07 VITALS
WEIGHT: 173 LBS | HEIGHT: 65 IN | HEART RATE: 71 BPM | SYSTOLIC BLOOD PRESSURE: 120 MMHG | RESPIRATION RATE: 21 BRPM | OXYGEN SATURATION: 98 % | BODY MASS INDEX: 28.82 KG/M2 | DIASTOLIC BLOOD PRESSURE: 70 MMHG | TEMPERATURE: 98 F

## 2023-12-07 DIAGNOSIS — Z12.11 SCREENING FOR MALIGNANT NEOPLASM OF COLON: ICD-10-CM

## 2023-12-07 DIAGNOSIS — N18.31 STAGE 3A CHRONIC KIDNEY DISEASE (HCC): ICD-10-CM

## 2023-12-07 DIAGNOSIS — Z00.00 ENCOUNTER FOR MEDICARE ANNUAL WELLNESS EXAM: Primary | ICD-10-CM

## 2023-12-07 DIAGNOSIS — I10 BENIGN ESSENTIAL HYPERTENSION: ICD-10-CM

## 2023-12-07 DIAGNOSIS — G89.29 CHRONIC PAIN OF LEFT ANKLE: ICD-10-CM

## 2023-12-07 DIAGNOSIS — Z87.440 HISTORY OF RECURRENT UTIS: ICD-10-CM

## 2023-12-07 DIAGNOSIS — Z85.3 HISTORY OF LEFT BREAST CANCER: ICD-10-CM

## 2023-12-07 DIAGNOSIS — E11.9 TYPE 2 DIABETES MELLITUS WITHOUT COMPLICATION, WITHOUT LONG-TERM CURRENT USE OF INSULIN (HCC): ICD-10-CM

## 2023-12-07 DIAGNOSIS — K57.30 DIVERTICULOSIS OF COLON: ICD-10-CM

## 2023-12-07 DIAGNOSIS — R92.30 DENSE BREAST TISSUE: ICD-10-CM

## 2023-12-07 DIAGNOSIS — M25.572 CHRONIC PAIN OF LEFT ANKLE: ICD-10-CM

## 2023-12-07 DIAGNOSIS — Z12.39 ENCOUNTER FOR BREAST CANCER SCREENING USING NON-MAMMOGRAM MODALITY: ICD-10-CM

## 2023-12-07 DIAGNOSIS — E78.2 MIXED HYPERLIPIDEMIA: ICD-10-CM

## 2023-12-07 DIAGNOSIS — L20.84 INTRINSIC ECZEMA: ICD-10-CM

## 2023-12-07 DIAGNOSIS — Z98.41: ICD-10-CM

## 2023-12-07 PROBLEM — H25.812 COMBINED FORMS OF AGE-RELATED CATARACT, LEFT EYE: Status: ACTIVE | Noted: 2023-12-07

## 2023-12-07 PROBLEM — J20.9 BRONCHITIS WITH BRONCHOSPASM: Status: RESOLVED | Noted: 2018-06-08 | Resolved: 2023-12-07

## 2023-12-07 PROBLEM — H35.379 EPIRETINAL MEMBRANE: Status: ACTIVE | Noted: 2023-12-07

## 2023-12-07 LAB
T4 FREE SERPL-MCNC: 0.8 NG/DL (ref 0.8–1.7)
TSI SER-ACNC: 4.03 MIU/ML (ref 0.36–3.74)

## 2023-12-07 PROCEDURE — 3008F BODY MASS INDEX DOCD: CPT | Performed by: STUDENT IN AN ORGANIZED HEALTH CARE EDUCATION/TRAINING PROGRAM

## 2023-12-07 PROCEDURE — 1160F RVW MEDS BY RX/DR IN RCRD: CPT | Performed by: STUDENT IN AN ORGANIZED HEALTH CARE EDUCATION/TRAINING PROGRAM

## 2023-12-07 PROCEDURE — 96160 PT-FOCUSED HLTH RISK ASSMT: CPT | Performed by: STUDENT IN AN ORGANIZED HEALTH CARE EDUCATION/TRAINING PROGRAM

## 2023-12-07 PROCEDURE — 1159F MED LIST DOCD IN RCRD: CPT | Performed by: STUDENT IN AN ORGANIZED HEALTH CARE EDUCATION/TRAINING PROGRAM

## 2023-12-07 PROCEDURE — 99499 UNLISTED E&M SERVICE: CPT | Performed by: STUDENT IN AN ORGANIZED HEALTH CARE EDUCATION/TRAINING PROGRAM

## 2023-12-07 PROCEDURE — 99214 OFFICE O/P EST MOD 30 MIN: CPT | Performed by: STUDENT IN AN ORGANIZED HEALTH CARE EDUCATION/TRAINING PROGRAM

## 2023-12-07 PROCEDURE — G0439 PPPS, SUBSEQ VISIT: HCPCS | Performed by: STUDENT IN AN ORGANIZED HEALTH CARE EDUCATION/TRAINING PROGRAM

## 2023-12-07 PROCEDURE — 1123F ACP DISCUSS/DSCN MKR DOCD: CPT | Performed by: STUDENT IN AN ORGANIZED HEALTH CARE EDUCATION/TRAINING PROGRAM

## 2023-12-07 PROCEDURE — 1170F FXNL STATUS ASSESSED: CPT | Performed by: STUDENT IN AN ORGANIZED HEALTH CARE EDUCATION/TRAINING PROGRAM

## 2023-12-07 PROCEDURE — 3078F DIAST BP <80 MM HG: CPT | Performed by: STUDENT IN AN ORGANIZED HEALTH CARE EDUCATION/TRAINING PROGRAM

## 2023-12-07 PROCEDURE — 1126F AMNT PAIN NOTED NONE PRSNT: CPT | Performed by: STUDENT IN AN ORGANIZED HEALTH CARE EDUCATION/TRAINING PROGRAM

## 2023-12-07 PROCEDURE — 3074F SYST BP LT 130 MM HG: CPT | Performed by: STUDENT IN AN ORGANIZED HEALTH CARE EDUCATION/TRAINING PROGRAM

## 2023-12-07 RX ORDER — LISINOPRIL 10 MG/1
10 TABLET ORAL DAILY
Qty: 90 TABLET | Refills: 1 | Status: SHIPPED | OUTPATIENT
Start: 2023-12-07

## 2023-12-07 RX ORDER — METFORMIN HYDROCHLORIDE 500 MG/1
1000 TABLET, EXTENDED RELEASE ORAL 2 TIMES DAILY WITH MEALS
Qty: 360 TABLET | Refills: 1 | Status: SHIPPED | OUTPATIENT
Start: 2023-12-07

## 2023-12-07 RX ORDER — SULFAMETHOXAZOLE AND TRIMETHOPRIM 800; 160 MG/1; MG/1
1 TABLET ORAL 2 TIMES DAILY
Qty: 10 TABLET | Refills: 0 | Status: SHIPPED | OUTPATIENT
Start: 2023-12-07 | End: 2023-12-12

## 2023-12-07 RX ORDER — ATORVASTATIN CALCIUM 10 MG/1
10 TABLET, FILM COATED ORAL NIGHTLY
Qty: 90 TABLET | Refills: 1 | Status: SHIPPED | OUTPATIENT
Start: 2023-12-07

## 2023-12-08 DIAGNOSIS — R79.89 ELEVATED TSH: Primary | ICD-10-CM

## 2024-06-02 DIAGNOSIS — E78.2 MIXED HYPERLIPIDEMIA: ICD-10-CM

## 2024-06-02 DIAGNOSIS — E11.9 TYPE 2 DIABETES MELLITUS WITHOUT COMPLICATION, WITHOUT LONG-TERM CURRENT USE OF INSULIN (HCC): ICD-10-CM

## 2024-06-03 ENCOUNTER — TELEPHONE (OUTPATIENT)
Dept: FAMILY MEDICINE CLINIC | Facility: CLINIC | Age: 74
End: 2024-06-03

## 2024-06-04 RX ORDER — ATORVASTATIN CALCIUM 10 MG/1
10 TABLET, FILM COATED ORAL NIGHTLY
Qty: 90 TABLET | Refills: 0 | Status: SHIPPED | OUTPATIENT
Start: 2024-06-04

## 2024-06-04 NOTE — TELEPHONE ENCOUNTER
Last office visit: 12/7/2024  Last Refill: 12/7/2023  Return To Clinic: 6/7/2024  Protocol: pass  Medication Quantity Refills Start End   atorvastatin 10 MG Oral Tab 90 tablet 1 12/7/2023 --   Sig:   Take 1 tablet (10 mg total) by mouth nightly.     Route:   Oral         Did not pass protocol  Last office visit   Last refill was: , 12/7/ 2023  360 __tabs  Next appointment: 6/10/2024    Please sign pended medication if appropriate   Medication Quantity Refills Start End   metFORMIN  MG Oral Tablet 24 Hr 360 tablet 1 12/7/2023 --   Sig:   Take 2 tablets (1,000 mg total) by mouth 2 (two) times daily with meals.     Route:   Oral

## 2024-06-05 ENCOUNTER — LAB ENCOUNTER (OUTPATIENT)
Dept: LAB | Age: 74
End: 2024-06-05
Attending: STUDENT IN AN ORGANIZED HEALTH CARE EDUCATION/TRAINING PROGRAM
Payer: MEDICARE

## 2024-06-05 DIAGNOSIS — Z87.440 HISTORY OF RECURRENT UTIS: ICD-10-CM

## 2024-06-05 DIAGNOSIS — I10 BENIGN ESSENTIAL HYPERTENSION: ICD-10-CM

## 2024-06-05 DIAGNOSIS — R79.89 ELEVATED TSH: ICD-10-CM

## 2024-06-05 DIAGNOSIS — E11.9 TYPE 2 DIABETES MELLITUS WITHOUT COMPLICATION, WITHOUT LONG-TERM CURRENT USE OF INSULIN (HCC): ICD-10-CM

## 2024-06-05 LAB
ALBUMIN SERPL-MCNC: 3.6 G/DL (ref 3.4–5)
ALBUMIN/GLOB SERPL: 1.2 {RATIO} (ref 1–2)
ALP LIVER SERPL-CCNC: 80 U/L
ALT SERPL-CCNC: 28 U/L
ANION GAP SERPL CALC-SCNC: 9 MMOL/L (ref 0–18)
AST SERPL-CCNC: 23 U/L (ref 15–37)
BASOPHILS # BLD AUTO: 0.08 X10(3) UL (ref 0–0.2)
BASOPHILS NFR BLD AUTO: 1.4 %
BILIRUB SERPL-MCNC: 0.5 MG/DL (ref 0.1–2)
BUN BLD-MCNC: 19 MG/DL (ref 9–23)
CALCIUM BLD-MCNC: 9.2 MG/DL (ref 8.5–10.1)
CHLORIDE SERPL-SCNC: 108 MMOL/L (ref 98–112)
CHOLEST SERPL-MCNC: 98 MG/DL (ref ?–200)
CO2 SERPL-SCNC: 23 MMOL/L (ref 21–32)
CREAT BLD-MCNC: 1.09 MG/DL
CREAT UR-SCNC: 30.9 MG/DL
EGFRCR SERPLBLD CKD-EPI 2021: 54 ML/MIN/1.73M2 (ref 60–?)
EOSINOPHIL # BLD AUTO: 0.48 X10(3) UL (ref 0–0.7)
EOSINOPHIL NFR BLD AUTO: 8.5 %
ERYTHROCYTE [DISTWIDTH] IN BLOOD BY AUTOMATED COUNT: 12.7 %
EST. AVERAGE GLUCOSE BLD GHB EST-MCNC: 154 MG/DL (ref 68–126)
FASTING PATIENT LIPID ANSWER: YES
FASTING STATUS PATIENT QL REPORTED: YES
GLOBULIN PLAS-MCNC: 3.1 G/DL (ref 2.8–4.4)
GLUCOSE BLD-MCNC: 121 MG/DL (ref 70–99)
HBA1C MFR BLD: 7 % (ref ?–5.7)
HCT VFR BLD AUTO: 38.4 %
HDLC SERPL-MCNC: 36 MG/DL (ref 40–59)
HGB BLD-MCNC: 12.4 G/DL
IMM GRANULOCYTES # BLD AUTO: 0.01 X10(3) UL (ref 0–1)
IMM GRANULOCYTES NFR BLD: 0.2 %
LDLC SERPL CALC-MCNC: 39 MG/DL (ref ?–100)
LYMPHOCYTES # BLD AUTO: 1.82 X10(3) UL (ref 1–4)
LYMPHOCYTES NFR BLD AUTO: 32 %
MCH RBC QN AUTO: 29 PG (ref 26–34)
MCHC RBC AUTO-ENTMCNC: 32.3 G/DL (ref 31–37)
MCV RBC AUTO: 89.9 FL
MICROALBUMIN UR-MCNC: <0.5 MG/DL
MONOCYTES # BLD AUTO: 0.34 X10(3) UL (ref 0.1–1)
MONOCYTES NFR BLD AUTO: 6 %
NEUTROPHILS # BLD AUTO: 2.95 X10 (3) UL (ref 1.5–7.7)
NEUTROPHILS # BLD AUTO: 2.95 X10(3) UL (ref 1.5–7.7)
NEUTROPHILS NFR BLD AUTO: 51.9 %
NONHDLC SERPL-MCNC: 62 MG/DL (ref ?–130)
OSMOLALITY SERPL CALC.SUM OF ELEC: 294 MOSM/KG (ref 275–295)
PLATELET # BLD AUTO: 259 10(3)UL (ref 150–450)
POTASSIUM SERPL-SCNC: 4.5 MMOL/L (ref 3.5–5.1)
PROT SERPL-MCNC: 6.7 G/DL (ref 6.4–8.2)
RBC # BLD AUTO: 4.27 X10(6)UL
SODIUM SERPL-SCNC: 140 MMOL/L (ref 136–145)
T4 FREE SERPL-MCNC: 0.9 NG/DL (ref 0.8–1.7)
TRIGL SERPL-MCNC: 128 MG/DL (ref 30–149)
TSI SER-ACNC: 3.96 MIU/ML (ref 0.36–3.74)
VLDLC SERPL CALC-MCNC: 18 MG/DL (ref 0–30)
WBC # BLD AUTO: 5.7 X10(3) UL (ref 4–11)

## 2024-06-05 PROCEDURE — 84443 ASSAY THYROID STIM HORMONE: CPT

## 2024-06-05 PROCEDURE — 85025 COMPLETE CBC W/AUTO DIFF WBC: CPT

## 2024-06-05 PROCEDURE — 82570 ASSAY OF URINE CREATININE: CPT

## 2024-06-05 PROCEDURE — 80061 LIPID PANEL: CPT

## 2024-06-05 PROCEDURE — 83036 HEMOGLOBIN GLYCOSYLATED A1C: CPT

## 2024-06-05 PROCEDURE — 80053 COMPREHEN METABOLIC PANEL: CPT

## 2024-06-05 PROCEDURE — 84439 ASSAY OF FREE THYROXINE: CPT

## 2024-06-05 PROCEDURE — 82043 UR ALBUMIN QUANTITATIVE: CPT

## 2024-06-05 RX ORDER — METFORMIN HYDROCHLORIDE 500 MG/1
1000 TABLET, EXTENDED RELEASE ORAL 2 TIMES DAILY WITH MEALS
Qty: 360 TABLET | Refills: 0 | Status: SHIPPED | OUTPATIENT
Start: 2024-06-05

## 2024-06-10 ENCOUNTER — OFFICE VISIT (OUTPATIENT)
Dept: FAMILY MEDICINE CLINIC | Facility: CLINIC | Age: 74
End: 2024-06-10
Payer: MEDICARE

## 2024-06-10 VITALS
BODY MASS INDEX: 28.99 KG/M2 | SYSTOLIC BLOOD PRESSURE: 132 MMHG | WEIGHT: 174 LBS | HEIGHT: 65 IN | RESPIRATION RATE: 16 BRPM | DIASTOLIC BLOOD PRESSURE: 70 MMHG | HEART RATE: 78 BPM | TEMPERATURE: 97 F

## 2024-06-10 DIAGNOSIS — E78.2 MIXED HYPERLIPIDEMIA: ICD-10-CM

## 2024-06-10 DIAGNOSIS — C50.912 MALIGNANT NEOPLASM OF LEFT FEMALE BREAST, UNSPECIFIED ESTROGEN RECEPTOR STATUS, UNSPECIFIED SITE OF BREAST (HCC): ICD-10-CM

## 2024-06-10 DIAGNOSIS — M25.572 CHRONIC PAIN OF LEFT ANKLE: ICD-10-CM

## 2024-06-10 DIAGNOSIS — G89.29 CHRONIC PAIN OF LEFT ANKLE: ICD-10-CM

## 2024-06-10 DIAGNOSIS — R79.89 ELEVATED TSH: ICD-10-CM

## 2024-06-10 DIAGNOSIS — Z12.31 VISIT FOR SCREENING MAMMOGRAM: ICD-10-CM

## 2024-06-10 DIAGNOSIS — E11.9 TYPE 2 DIABETES MELLITUS WITHOUT COMPLICATION, WITHOUT LONG-TERM CURRENT USE OF INSULIN (HCC): Primary | ICD-10-CM

## 2024-06-10 DIAGNOSIS — I10 BENIGN ESSENTIAL HYPERTENSION: ICD-10-CM

## 2024-06-10 PROCEDURE — 99214 OFFICE O/P EST MOD 30 MIN: CPT | Performed by: STUDENT IN AN ORGANIZED HEALTH CARE EDUCATION/TRAINING PROGRAM

## 2024-06-10 PROCEDURE — G2211 COMPLEX E/M VISIT ADD ON: HCPCS | Performed by: STUDENT IN AN ORGANIZED HEALTH CARE EDUCATION/TRAINING PROGRAM

## 2024-06-10 RX ORDER — LISINOPRIL 10 MG/1
10 TABLET ORAL DAILY
Qty: 90 TABLET | Refills: 1 | Status: SHIPPED | OUTPATIENT
Start: 2024-06-10

## 2024-06-10 NOTE — PROGRESS NOTES
[facility] Family Medicine Note  06/10/24    Chief Complaint   Patient presents with    Medication Follow-Up     diabetes     HPI:   Rosario Robb is a 73 year old female who presents for follow up.    Left ankle swells during the week. Will have to ice the left ankle as well. Will wear [employer] shoes. Hx plantar fasciitis in the past but this feels different. Depends on how much she walks. Hx prior sprains but no major injuries. Has been bothersome since at least December 2023. Icing helps. Trying to not take medications for this. She is wondering if an x-ray or specialist referral is needed.    Patient presents for recheck of her hypertension. Pt has been taking medications as instructed, no medication side effects. Has BP cuff at home.   BP Meds: lisinopril Tabs - 10 MG     Patient presents for follow up of hyperlipidemia. Patient has been taking medications as prescribed, no muscle aches noted. Working on diet and exercise.   Cholesterol Meds: atorvastatin Tabs - 10 MG   Cholesterol: 98, done on 6/5/2024.  HDL Cholesterol: 36, done on 6/5/2024.  LDL Cholesterol: 39, done on 6/5/2024.  TriGlycerides 128, done on 6/5/2024.    Patient presents for diabetes follow up. Patient has been taking medications as prescribed. Working on diet and exercise. No low blood sugars noted.   DM Meds: metFORMIN ER Tb24 - 500 MG twice a day with meals; SITagliptin Phosphate Tabs - 100 MG     It is difficult to lose weight. Walking dog for up to 30 minutes. Also gardens. Mows the lawn once a week. Goal is 5000 steps in a day.     Lab Results   Component Value Date    A1C 7.0 (H) 06/05/2024    A1C 6.9 (H) 12/04/2023    A1C 6.9 (H) 05/25/2023    A1C 6.6 (H) 11/03/2022    A1C 7.4 (H) 06/09/2022      Last Diabetic Eye Exam:  Last Dilated Eye Exam: 08/03/23  Eye Exam shows Diabetic Retinopathy?: No      Wt Readings from Last 6 Encounters:   06/10/24 174 lb (78.9 kg)   12/07/23 173 lb (78.5 kg)   06/08/23 174 lb  (78.9 kg)   11/10/22 172 lb (78 kg)   22 171 lb (77.6 kg)   22 170 lb (77.1 kg)       Past Medical History:    Breast CA (HCC)    Bronchitis with bronchospasm    Cancer (HCC)    Diabetes (HCC)    Essential hypertension    History of mammogram    normal    Leukocytoclastic vasculitis (HCC)    Malignant neoplasm of female breast (HCC)     Past Surgical History:   Procedure Laterality Date    Cataract  right eye 2019    Chemotherapy  2002    Colonoscopy  2014    Lumpectomy left  2002    left breast      ,    Other surgical history  left breast lumpectomy, axillary lymph node ,    carpal tunnel both hands     Radiation left  2002    Revise median n/carpal tunnel surg  2007    neuroplasty both      No Known Allergies   lisinopril 10 MG Oral Tab Take 1 tablet (10 mg total) by mouth daily. 90 tablet 1    SITagliptin Phosphate (JANUVIA) 100 MG Oral Tab Take 1 tablet (100 mg total) by mouth daily. 90 tablet 1    METFORMIN  MG Oral Tablet 24 Hr TAKE 2 TABLETS TWICE A DAY WITH MEALS 360 tablet 0    atorvastatin 10 MG Oral Tab TAKE 1 TABLET NIGHTLY 90 tablet 0    clobetasol 0.05 % External Solution APPLY TOPICALLY TO AFFECTED AREA(S) NIGHTLY 50 mL 1    Glucose Blood In Vitro Strip TEST  DAILY E11.9 type 2  strip 3    Ascorbic Acid (VITAMIN C) 1000 MG Oral Tab Take 1 tablet (1,000 mg total) by mouth daily.      Cranberry 250 MG Oral Cap Take by mouth.      Aspirin 81 MG Oral Tab Take 1 tablet (81 mg total) by mouth daily.      Omega-3 Fatty Acids (FISH OIL) 1000 MG Oral Cap Take 1,000 mg by mouth daily.       Social History     Socioeconomic History    Marital status:    Tobacco Use    Smoking status: Never    Smokeless tobacco: Never    Tobacco comments:     never smoked   Vaping Use    Vaping status: Never Used   Substance and Sexual Activity    Alcohol use: Yes     Alcohol/week: 2.0 standard drinks of alcohol     Types: 2 Glasses of wine per week      Comment: 2 glasses of wine per week    Drug use: Never    Sexual activity: Yes   Other Topics Concern    Caffeine Concern No    Stress Concern No    Weight Concern No    Special Diet No    Exercise No    Seat Belt No     Counseling given: Not Answered  Tobacco comments: never smoked    Family History   Problem Relation Age of Onset    Cancer Father 52        lung cancer   of pulm emboli    Cancer Mother         glioblastoma of brain      Breast Cancer Self 52    Ovarian Cancer Sister 46    Cancer Sister         ovarian cancer      Cancer Sister         cancer of uterine lining     Family Status   Relation Status    Fa  at age 52        lung cancer    MGFA     MGMA  at age 75        alzhemier    Mo  at age 57        glioblastoma    PGFA     PGMA     Self (Not Specified)    Sis (Not Specified)    Sis (Not Specified)    Sis (Not Specified)        REVIEW OF SYSTEMS:   ROS:  CONSTITUTIONAL:  Denies fever  HEENT: Denies sore throat  CARDIOVASCULAR:  Denies chest pain  RESPIRATORY:  Denies shortness of breath  GASTROINTESTINAL:  Denies abdominal pain  NEUROLOGICAL:  Denies headache, dizziness  MUSCULOSKELETAL:  see HPI  LYMPHATIC: Denies edema  ALLERGIES:  Denies history allergies    EXAM:   /70 (BP Location: Right arm, Patient Position: Sitting, Cuff Size: adult)   Pulse 78   Temp 97 °F (36.1 °C) (Temporal)   Resp 16   Ht 5' 5\" (1.651 m)   Wt 174 lb (78.9 kg)   BMI 28.96 kg/m²  Estimated body mass index is 28.96 kg/m² as calculated from the following:    Height as of this encounter: 5' 5\" (1.651 m).    Weight as of this encounter: 174 lb (78.9 kg).   Vital signs reviewed. Appears stated age, well groomed.  Physical Exam:  GEN:  Patient is alert and oriented x3, no apparent distress  HEAD:  Normocephalic, atraumatic  HEENT:  no scleral icterus, conjunctivae clear bilaterally, EOMI, PERRLA, OP clear  LUNGS: clear to auscultation bilaterally,  no rales/rhonchi/wheezing  HEART:  Regular rate and rhythm, normal S1/S2, no murmurs, rubs or gallops  EXTREMITIES:  Moves all extremities well, no point tenderness in ankle, negative squeeze test, no redness  FEET: Bilateral barefoot skin diabetic exam is normal, visualized feet and the appearance is normal. Bilateral monofilament/sensation of both feet is normal. Pulsation pedal pulse exam of both lower legs/feet is normal as well.  NEURO:  CN 2 - 12 grossly intact, gait normal    ASSESSMENT AND PLAN:   1. Type 2 diabetes mellitus without complication, without long-term current use of insulin (HCC)  Pt presents for diabetes follow up  - Control: stable and reasonably well controlled  - Eye exam: due in August 2024  - Foot exam: 06/10/24  - BP: at goal see below  - Lipids: at goal see below  - discussed diet, exercise, medication treatment  - check feet and in between toes  - repeat labs in 6 months  - CMP in 6 months; Future  - Lipid in 6 months; Future  - Hemoglobin A1C in 6 months; Future  - TSH W Reflex To Free T4; Future  - lisinopril 10 MG Oral Tab; Take 1 tablet (10 mg total) by mouth daily.  Dispense: 90 tablet; Refill: 1  - SITagliptin Phosphate (JANUVIA) 100 MG Oral Tab; Take 1 tablet (100 mg total) by mouth daily.  Dispense: 90 tablet; Refill: 1    2. Chronic pain of left ankle  Patient presents for left ankle pain for 6 months  - differential diagnosis includes arthritis, sprain, bone spur  - will check XR to evaluate further  - consider physical therapy pending XR  - patient is interested in podiatry and ortho referrals, appreciate evaluation and recommendations  - XR ANKLE WEIGHTBEARING (3 VIEWS), LEFT (CPT=73610); Future  - Podiatry Referral - In Network  - Ortho Referral - In Network    3. Mixed hyperlipidemia  Patient presents for follow up of HLD  - no side effects of medications  - will continue current regimen of atorvastatin 10mg at bedtime  - low fat diet and exercise  - follow up in 6mo or  sooner if needed    4. Benign essential hypertension  Pt presents for follow up of HTN  - no red flag symptoms  - BP controlled  - continue current regimen of lisinopril 10mg daily  - RTC 6mo or sooner if needed  - lisinopril 10 MG Oral Tab; Take 1 tablet (10 mg total) by mouth daily.  Dispense: 90 tablet; Refill: 1    5. Visit for screening mammogram  Patient due for mammogram  - 3D Mammogram Digital Screen, Bilateral (CPT=77067/26064); Future    6. Malignant neoplasm of left female breast, unspecified estrogen receptor status, unspecified site of breast (HCC)  - 3D Mammogram Digital Screen, Bilateral (CPT=77067/15537); Future    7. Elevated TSH  TSH was borderline elevated, will follow  - TSH W Reflex To Free T4; Future        Meds & Refills for this Visit:  Requested Prescriptions     Signed Prescriptions Disp Refills    lisinopril 10 MG Oral Tab 90 tablet 1     Sig: Take 1 tablet (10 mg total) by mouth daily.    SITagliptin Phosphate (JANUVIA) 100 MG Oral Tab 90 tablet 1     Sig: Take 1 tablet (100 mg total) by mouth daily.           Health Maintenance:  Health Maintenance Due   Topic Date Due    Diabetes Care Foot Exam  04/26/2022    MA Annual Health Assessment  01/01/2024    Annual Depression Screening  01/01/2024    Fall Risk Screening (Annual)  01/01/2024    COVID-19 Vaccine (8 - 2023-24 season) 03/07/2024    Mammogram  07/26/2024    Diabetes Care Dilated Eye Exam  08/03/2024    Colorectal Cancer Screening  09/22/2024       Patient/Caregiver Education: There are no barriers to learning. Medical education done.   Outcome: Patient verbalizes understanding. Patient is notified to call with any questions, complications, allergies, or worsening or changing symptoms.  Patient is to call with any side effects or complications from the treatments as a result of today.     Problem List:  Patient Active Problem List   Diagnosis    Type 2 diabetes mellitus (HCC)    Hyperlipidemia    Benign essential hypertension     Intrinsic eczema    Combined forms of age-related cataract, left eye    Diverticulosis of colon    Epiretinal membrane    Stage 3a chronic kidney disease (HCC)    S/P laser cataract surgery, right    History of recurrent UTIs    History of left breast cancer       Return in about 6 months (around 12/10/2024) for medication follow up, medicare wellness visit, or sooner if needed.    Deysi Joel MD  [facility]  06/10/24      Please note that portions of this note may have been completed with a voice recognition program. Efforts were made to edit the dictations but occasionally words are mis-transcribed. Thank you for your understanding.

## 2024-06-14 ENCOUNTER — HOSPITAL ENCOUNTER (OUTPATIENT)
Dept: GENERAL RADIOLOGY | Age: 74
Discharge: HOME | End: 2024-06-14
Attending: STUDENT IN AN ORGANIZED HEALTH CARE EDUCATION/TRAINING PROGRAM
Payer: MEDICARE

## 2024-06-14 DIAGNOSIS — M25.572 CHRONIC PAIN OF LEFT ANKLE: ICD-10-CM

## 2024-06-14 DIAGNOSIS — G89.29 CHRONIC PAIN OF LEFT ANKLE: ICD-10-CM

## 2024-06-14 PROCEDURE — 73610 X-RAY EXAM OF ANKLE: CPT | Performed by: STUDENT IN AN ORGANIZED HEALTH CARE EDUCATION/TRAINING PROGRAM

## 2024-06-15 DIAGNOSIS — M25.572 CHRONIC PAIN OF LEFT ANKLE: ICD-10-CM

## 2024-06-15 DIAGNOSIS — G89.29 CHRONIC PAIN OF LEFT ANKLE: ICD-10-CM

## 2024-06-15 DIAGNOSIS — M77.52 BONE SPUR OF LEFT ANKLE: Primary | ICD-10-CM

## 2024-07-03 ENCOUNTER — OFFICE VISIT (OUTPATIENT)
Dept: ORTHOPEDICS CLINIC | Facility: CLINIC | Age: 74
End: 2024-07-03
Payer: MEDICARE

## 2024-07-03 DIAGNOSIS — M25.472 SWELLING OF ANKLE, LEFT: ICD-10-CM

## 2024-07-03 DIAGNOSIS — E11.9 TYPE 2 DIABETES MELLITUS WITHOUT COMPLICATION, WITHOUT LONG-TERM CURRENT USE OF INSULIN (HCC): ICD-10-CM

## 2024-07-03 DIAGNOSIS — M77.9 TENDONITIS: Primary | ICD-10-CM

## 2024-07-03 DIAGNOSIS — M76.829 POSTERIOR TIBIAL TENDON DYSFUNCTION: ICD-10-CM

## 2024-07-03 RX ORDER — MELOXICAM 7.5 MG/1
7.5 TABLET ORAL DAILY
Qty: 15 TABLET | Refills: 1 | Status: SHIPPED | OUTPATIENT
Start: 2024-07-03

## 2024-07-03 NOTE — PROGRESS NOTES
[facility] New Patient Note    CC:   Chief Complaint   Patient presents with    Ankle Pain     Lt ankle swelling/burning after walking, onset: 6mos. Ago  No numb/ting       HPI: The patient is a 73 year old female who presents today with complaints of left ankle swelling and burning after time of feet and walking        Hx of PF in past  Doing things in yard will bother  More burn and swelling than pain  Maybe is compensating aand bothering other knee    About 2 months or more  Hx of sprains of ankle in past    She is a retired nurse        Past Medical History:    Breast CA (HCC)    Bronchitis with bronchospasm    Cancer (HCC)    Diabetes (HCC)    Essential hypertension    History of mammogram    normal    Leukocytoclastic vasculitis (HCC)    Malignant neoplasm of female breast (HCC)     Past Surgical History:   Procedure Laterality Date    Cataract  right eye 2019    Chemotherapy  2002    Colonoscopy  2014    Lumpectomy left  2002    left breast      ,    Other surgical history  left breast lumpectomy, axillary lymph node ,    carpal tunnel both hands 2007    Radiation left  2002    Revise median n/carpal tunnel surg  2007    neuroplasty both      Current Outpatient Medications   Medication Sig Dispense Refill    Meloxicam 7.5 MG Oral Tab Take 1 tablet (7.5 mg total) by mouth daily. 15 tablet 1    lisinopril 10 MG Oral Tab Take 1 tablet (10 mg total) by mouth daily. 90 tablet 1    SITagliptin Phosphate (JANUVIA) 100 MG Oral Tab Take 1 tablet (100 mg total) by mouth daily. 90 tablet 1    METFORMIN  MG Oral Tablet 24 Hr TAKE 2 TABLETS TWICE A DAY WITH MEALS 360 tablet 0    atorvastatin 10 MG Oral Tab TAKE 1 TABLET NIGHTLY 90 tablet 0    clobetasol 0.05 % External Solution APPLY TOPICALLY TO AFFECTED AREA(S) NIGHTLY 50 mL 1    Glucose Blood In Vitro Strip TEST  DAILY E11.9 type 2  strip 3    Ascorbic Acid (VITAMIN C) 1000 MG Oral Tab Take 1 tablet (1,000  mg total) by mouth daily.      Cranberry 250 MG Oral Cap Take by mouth.      Aspirin 81 MG Oral Tab Take 1 tablet (81 mg total) by mouth daily.      Omega-3 Fatty Acids (FISH OIL) 1000 MG Oral Cap Take 1,000 mg by mouth daily.       No Known Allergies  Family History   Problem Relation Age of Onset    Cancer Father 52        lung cancer   of pulm emboli    Cancer Mother         glioblastoma of brain      Breast Cancer Self 52    Ovarian Cancer Sister 46    Cancer Sister         ovarian cancer      Cancer Sister         cancer of uterine lining     Social History     Occupational History    Not on file   Tobacco Use    Smoking status: Never    Smokeless tobacco: Never    Tobacco comments:     never smoked   Vaping Use    Vaping status: Never Used   Substance and Sexual Activity    Alcohol use: Yes     Alcohol/week: 2.0 standard drinks of alcohol     Types: 2 Glasses of wine per week     Comment: 2 glasses of wine per week    Drug use: Never    Sexual activity: Yes        ROS:  Complete ROS reviewed by me and non-contributory to the chief complaint except as mentioned above.    Physical Exam:    There were no vitals taken for this visit.      Exam upon stance neutral to flexible flatfoot a little flattening on the left side.  Mild subtle swelling about the medial ankle and foot left.  Tenderness at the navicular tuberosity and talonavicular joint.  Anterior medial ankle is sore as well with mild swelling.  No pain across the dorsal navicular.  Discomfort with rising onto her toes both feet and single stance.  She has a little weakness, 4 out of 5 of the PT tendon with this maneuver single leg.  Sensation is intact sharp versus dull.  She can feel light touch to the tips of the toes  Palpable pedal pulses.  Hair growth is present.  Skin is supple and feet are well perfused and warm.    Strength is 5 out of 5 all muscle groups  (left PT 4/5)    Full range of motion and nonpainful motion of the  ankle joint, subtalar joint, MP joints, and midfoot joints.     Imaging: X-rays 3 views of the ankle show increased soft tissue density medially.  Ankle mortise is clear.  Possible old chip or avulsion fragment off the medial ankle.  Personally viewed, independently interpreted and radiology report read.      Assessment/Diagnoses:  Diagnoses and all orders for this visit:    Tendonitis  -     Physical Therapy Referral - [facility]    Posterior tibial tendon dysfunction  -     Physical Therapy Referral - [facility]    Swelling of ankle, left  -     Physical Therapy Referral - [facility]    Type 2 diabetes mellitus without complication, without long-term current use of insulin ([facility])    Other orders  -     Meloxicam 7.5 MG Oral Tab; Take 1 tablet (7.5 mg total) by mouth daily.        Plan:  I reviewed imaging and exam findings with the patient.  We looked at her x-rays together and they are normal.  We discussed the diagnosis of posterior tibial tendinitis, insertional tendinitis and early dysfunction of the PT tendon.  She is at risk of tear and we could offer her a low-profile cam boot but she is getting around okay without the need for that.    Good shoes and arch support are very important and discussed going to one of the running shoe stores to be fit for a light running shoe for stability  OTC arch supports  Foot ankle sleeve for compression  A lot of icing  RICE    PT  LE eval and treat  Modalities, graston and dry needling if available  Gait eval , taping    Rx meloxicam for 1 week course, 1 refill  Take with food and discontinue if any side effects which were discussed    MRI if not improving    Hermila Arrieta, [facility]  [facility]  [address], [address] [place] [id number]   [address] [place] [id number]   [address] [place] [address]  [email or web address]  [email or web address]  t: [phone]   f: [phone]              This document  was partially prepared using Dragon Medical voice recognition software.

## 2024-07-27 ENCOUNTER — HOSPITAL ENCOUNTER (OUTPATIENT)
Dept: MAMMOGRAPHY | Age: 74
Discharge: HOME | End: 2024-07-27
Attending: STUDENT IN AN ORGANIZED HEALTH CARE EDUCATION/TRAINING PROGRAM
Payer: MEDICARE

## 2024-07-27 DIAGNOSIS — Z12.31 VISIT FOR SCREENING MAMMOGRAM: ICD-10-CM

## 2024-07-27 DIAGNOSIS — C50.912 MALIGNANT NEOPLASM OF LEFT FEMALE BREAST, UNSPECIFIED ESTROGEN RECEPTOR STATUS, UNSPECIFIED SITE OF BREAST (HCC): ICD-10-CM

## 2024-07-27 PROCEDURE — 77063 BREAST TOMOSYNTHESIS BI: CPT | Performed by: STUDENT IN AN ORGANIZED HEALTH CARE EDUCATION/TRAINING PROGRAM

## 2024-07-27 PROCEDURE — 77067 SCR MAMMO BI INCL CAD: CPT | Performed by: STUDENT IN AN ORGANIZED HEALTH CARE EDUCATION/TRAINING PROGRAM

## 2024-09-02 DIAGNOSIS — E11.9 TYPE 2 DIABETES MELLITUS WITHOUT COMPLICATION, WITHOUT LONG-TERM CURRENT USE OF INSULIN (HCC): ICD-10-CM

## 2024-09-02 DIAGNOSIS — E78.2 MIXED HYPERLIPIDEMIA: ICD-10-CM

## 2024-09-04 RX ORDER — METFORMIN HCL 500 MG
1000 TABLET, EXTENDED RELEASE 24 HR ORAL 2 TIMES DAILY WITH MEALS
Qty: 360 TABLET | Refills: 0 | Status: SHIPPED | OUTPATIENT
Start: 2024-09-04

## 2024-09-04 RX ORDER — ATORVASTATIN CALCIUM 10 MG/1
10 TABLET, FILM COATED ORAL NIGHTLY
Qty: 90 TABLET | Refills: 0 | Status: SHIPPED | OUTPATIENT
Start: 2024-09-04

## 2024-09-04 NOTE — TELEPHONE ENCOUNTER
Last Office Visit: 6/10/24  Last Refill: 6/5/24   Return to Clinic: 6 months   Protocol: passed  NOV: 12/12/24  Requested Prescriptions     Pending Prescriptions Disp Refills    METFORMIN  MG Oral Tablet 24 Hr [Pharmacy Med Name: METFORMIN HCL ER TABS 500MG] 360 tablet 3     Sig: TAKE 2 TABLETS TWICE A DAY WITH MEALS    ATORVASTATIN 10 MG Oral Tab [Pharmacy Med Name: ATORVASTATIN TABS 10MG] 90 tablet 3     Sig: TAKE 1 TABLET NIGHTLY         Please approve if appropriate.     Thank you!

## 2024-09-18 ENCOUNTER — TELEPHONE (OUTPATIENT)
Dept: PHYSICAL THERAPY | Facility: HOSPITAL | Age: 74
End: 2024-09-18

## 2024-09-18 ENCOUNTER — OFFICE VISIT (OUTPATIENT)
Dept: PHYSICAL THERAPY | Facility: HOSPITAL | Age: 74
End: 2024-09-18
Attending: PODIATRIST
Payer: MEDICARE

## 2024-09-18 DIAGNOSIS — M76.829 POSTERIOR TIBIAL TENDON DYSFUNCTION: ICD-10-CM

## 2024-09-18 DIAGNOSIS — M77.9 TENDONITIS: Primary | ICD-10-CM

## 2024-09-18 DIAGNOSIS — M25.472 SWELLING OF ANKLE, LEFT: ICD-10-CM

## 2024-09-18 PROCEDURE — 97161 PT EVAL LOW COMPLEX 20 MIN: CPT

## 2024-09-18 PROCEDURE — 97530 THERAPEUTIC ACTIVITIES: CPT

## 2024-09-18 NOTE — PROGRESS NOTES
LOWER EXTREMITY EVALUATION:     Diagnosis:   Tendonitis (M77.9)  Posterior tibial tendon dysfunction (M76.829)  Swelling of ankle, left (M25.472)      Referring Provider: Hermila Arrieta  Date of Evaluation:    9/18/2024    Precautions:   Hx of L breast CA Next MD visit:   none scheduled  Date of Surgery: n/a     PATIENT SUMMARY   Rosario Robb is a 74 year old female who presents to therapy today with complaints of left medial ankle pain.  She reports she typically walks 30 min-60 min with the dog daily and does a lot of standing/walking in the yard working in the yard/gardening.  She reports the pain is typically below the medial malleolus and she does get swelling, typically in the evening.  She describes the pain as an achy pain, stating it is pretty isolated to the medial ankle. Notes the pain can be a burning type pain by the end of the day. Rates current pain level 0/10 and states the pain is 4-5/10 at worst. She reports aggravating factors include: descending stairs, walking on uneven surfaces, prolonged walking, and certain positions if the ankle is turned inward.  She states she does wear a compression sleeve as needed to manage swelling. She states she is typically in gym shoes but does not currently wear any type of orthotic. 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  Does report hx of bilateral knee pain as well as plantar fasciitis. She volunteers at the [facility] for 2 hours as well as [facility] for 3 hours a week so she stays pretty active.        Past medical history was reviewed. Significant findings include  has a past medical history of Breast CA ([facility]) (01/01/2002), Bronchitis with bronchospasm (06/08/2018), Cancer ([facility]) (See left breast cancer 2002), Diabetes ([facility]) (2005), Essential hypertension, History of mammogram (07/31/2012), Leukocytoclastic vasculitis ([facility]), and Malignant neoplasm of female breast ([facility]) (06/29/2012).     ASSESSMENT  Patient present with findings consistent  with medically referred diagnosis. Current impairments including the following: pain, swelling, slightly decreased strength, decreased ROM, impaired gait, impaired balance, impaired LE alignments control in CKC. Patient will benefit from physical therapy to address the above impairments and promote return to ADL and recerational activities as prior without pain or compensation.      OBJECTIVE:   Observation: foot pronation B in standing, increased in SLS.  Slight hip drop R/L in standing  Dynamic knee valgus L>R with fsu and squat activity  Palpation: mild tenderness to palpation just inferior to medial malleolus  Sensation: unremarkable    AROM: (* denotes performed with pain)  Hip Knee Foot/Ankle   WNL WNL    DF: R 10; L 8  PF: R 45; L 40  INV: R 40; L 25  EV: R 15; L 12       Accessory motion: decreased posterior talar glide on L    Flexibility:  Hip Flexor: tightness B  Gastroc-soleus: tightness B    Strength/MMT: (* denotes performed with pain)  Hip Knee Foot/Ankle   Flexion:NT  Extension: NT  Abduction: R 4+/5; L 4/5  ER: R 5/5; L 4+/5  IR: R 5/5; L 5/5 WNL    DF: R 5/5; L 5/5  PF: R 5/5; L 4+/5  INV: R 4+/5; L 4/5  EV: R 5/5; L 5/5       Special tests:   unremarkable    Gait: pt ambulates on level ground with foot ER, rolling off medial aspect of foot resulting in diminished \"toe off\", decreased step length, and decreased gait speed  Balance: SLS: R 10 sec, L 9 sec    Today’s Treatment and Response:   Pt education was provided on exam findings, treatment diagnosis, treatment plan, expectations, and prognosis. Pt was also provided recommendations for foot wear as well as pt edu in dynamic knee valgus and training in avoidance with activities with sit to stand, squat, and stepping. Brief pt edu in proper \"toe off\" pattern with gait.      Charges: PT Eval Low Complexity, TA      Total Timed Treatment: 12 min     Total Treatment Time: 45 min     Based on clinical rationale and outcome measures, this evaluation  involved Low Complexity decision making due to 1-2 personal factors/comorbidities, 4+ body structures involved/activity limitations, and evolving symptoms including changing pain levels.  PLAN OF CARE:    Goals: (to be met in 10 visits)  Independent with initial HEP (1-2 visits)    Improved DF ROM >=10 deg to promote proper toe-off during gait and greater ease descending stairs without compensation (10 visits)    Increased strength throughout ankle musculature to 5/5 for improved ankle control with ADL such as prolonged gait and stair negotiation (10 visits)    Increased balance/proprioception with patient able to perform SLS on Airex foam >= 10 seconds for increased ankle stability with activities such as gait on uneven surfaces (10 visits)    Improved functional strength with patient demonstrating ability to perform step up and squat with absence of dynamic knee valgus (10 visits)    Patient independent with a comprehensive HEP (10 visits)     Frequency / Duration: Patient will be seen for 1-2 x/week or a total of 10 visits over a 90 day period. Treatment will include: Gait training, Manual Therapy, Neuromuscular Re-education, Therapeutic Activities, Therapeutic Exercise, and Home Exercise Program instruction    Education or treatment limitation: None  Rehab Potential:good    LEFS Score  LEFS Score: 92.5 % (9/12/2024 10:47 AM)      Patient/Family/Caregiver was advised of these findings, precautions, and treatment options and has agreed to actively participate in planning and for this course of care.    Thank you for your referral. Please co-sign or sign and return this letter via fax as soon as possible to [phone]. If you have any questions, please contact me at Dept: [phone]    Sincerely,  Electronically signed by therapist: Yu Lafleur, PT  Physician's certification required: Yes  I certify the need for these services furnished under this plan of treatment and while under my  care.    X___________________________________________________ Date____________________    Certification From: 9/18/2024  To:12/17/2024

## 2024-09-24 ENCOUNTER — OFFICE VISIT (OUTPATIENT)
Dept: PHYSICAL THERAPY | Facility: HOSPITAL | Age: 74
End: 2024-09-24
Attending: PODIATRIST
Payer: MEDICARE

## 2024-09-24 PROCEDURE — 97110 THERAPEUTIC EXERCISES: CPT

## 2024-09-24 PROCEDURE — 97140 MANUAL THERAPY 1/> REGIONS: CPT

## 2024-09-24 NOTE — PROGRESS NOTES
Dx: Tendonitis (M77.9)  Posterior tibial tendon dysfunction (M76.829)  Swelling of ankle, left (M25.472)      Authorized # of visit: 10 (MDCR), no visit limit, no auth Next MD visits: none  Fall Risk: standard Precautions: none    Date: 9/24/24  Tx #: 2    Current Pain Level: not rated today      Subjective: Patient reports she continues to have some discomfort posterior and inferior to the medial malleolus on the left but it has not been as bad today as she has not done much walking.      Objective: Treatment per flow sheet.      Assessment: Issued tband sdly clam, standing squat w/ band ABD/ER, tband PF, and tband INV to HEP today.  Patient tolerating therex without c/o ankle pain.  Requires initial cuing with squat to prevent dynamic knee valgus but improves within first few reps.      Plan: Continue per plan of care.      Treatment Flow:  Manual: (15 min)  STM to post tib tendon extending proximally into muscle belly x8'  Manual posterior talar glide grade 3-4, multiple bouts  L ankle PROM  Therex:(x30 min)  Tband sdly clam 2x10 B  Tband squat 2x10  Tband PF 2x15  Tband INV 2x15  Tilt baord gastroc stretch x30\"  Tilt board soleus stretch x30\"  A/P tilt board tap x30    (HEP: tband sdly clam, standing squat w/ band ABD/ER, tband PF, and tband INV)    Charges: MAN, 2 GRACE Total Timed Treatment: 45 min  Total Treatment Time: 45 min

## 2024-09-26 ENCOUNTER — OFFICE VISIT (OUTPATIENT)
Dept: PHYSICAL THERAPY | Facility: HOSPITAL | Age: 74
End: 2024-09-26
Attending: PODIATRIST
Payer: MEDICARE

## 2024-09-26 PROCEDURE — 97110 THERAPEUTIC EXERCISES: CPT

## 2024-09-26 PROCEDURE — 97035 APP MDLTY 1+ULTRASOUND EA 15: CPT

## 2024-09-26 PROCEDURE — 97140 MANUAL THERAPY 1/> REGIONS: CPT

## 2024-09-26 NOTE — PROGRESS NOTES
Dx: Tendonitis (M77.9)  Posterior tibial tendon dysfunction (M76.829)  Swelling of ankle, left (M25.472)      Authorized # of visit: 10 (MDCR), no visit limit, no auth Next MD visits: none  Fall Risk: standard Precautions: none    Date: 9/26/24  Tx #: 3    Current Pain Level: 0/10 at rest, 3/10 when walking      Subjective: Patient reports the ankle is a little achy today, stating she had walked the dog this morning and then went to the grocery store so she has done quite a bit of walking already today.    Objective: Treatment per flow sheet.      Assessment: Trial of US to the medial ankle (PTT) today as well as trial of KT taping to the medial ankle. Patient demonstrating improved self awareness for proper LE alignment with squat activity today, preventing dynamic knee valgus and demonstrating decreased ankle valgus.    Plan: Continue per plan of care..      Treatment Flow:  US: (10' total) - 3.3MHz, 1.3w/cm2 x8' continuous  Manual: (15 min)  STM to post tib tendon extending proximally into muscle belly x8'  Manual posterior talar glide grade 3-4, multiple bouts  L ankle PROM  Therex:(x20 min)  KT taping L medial ankle  Tilt baord gastroc stretch x30\"  Tilt board soleus stretch x30\"  A/P tilt board tap x30  Airex DL squat 2x10    (HEP: tband sdly clam, standing squat w/ band ABD/ER, tband PF, and tband INV)    Charges: US, MAN, GRACE Total Timed Treatment: 45 min  Total Treatment Time: 45 min

## 2024-10-01 ENCOUNTER — OFFICE VISIT (OUTPATIENT)
Dept: PHYSICAL THERAPY | Facility: HOSPITAL | Age: 74
End: 2024-10-01
Attending: PODIATRIST
Payer: MEDICARE

## 2024-10-01 PROCEDURE — 97110 THERAPEUTIC EXERCISES: CPT

## 2024-10-01 PROCEDURE — 97140 MANUAL THERAPY 1/> REGIONS: CPT

## 2024-10-01 PROCEDURE — 97035 APP MDLTY 1+ULTRASOUND EA 15: CPT

## 2024-10-01 NOTE — PROGRESS NOTES
Dx: Tendonitis (M77.9)  Posterior tibial tendon dysfunction (M76.829)  Swelling of ankle, left (M25.472)      Authorized # of visit: 10 (MDCR), no visit limit, no auth Next MD visits: none  Fall Risk: standard Precautions: none    Date: 10/1/24  Tx #: 4    Current Pain Level: 0/10 at rest, 3/10 when walking      Subjective: Patient reports she had some increase pain on Sunday but unable to correlate the pain to any particular activity.  States it is feeling pretty good now despite going for a 45 minutes walk this morning and then volunteering at the [employer].    Objective: Treatment per flow sheet.      Assessment: Advanced therex today, incorporating bosu squat - patient initially performs with dynamic knee valgus but patient self-corrects and is able to keep neutral alignment with additional repetitions. Patient demonstrating improved ankle control with therex and absence of ankle valgus with squatting today.    Plan: Continue per plan of care. Advance therex for hip/LE/ankle strengthening next session with focus on alignment control.      Treatment Flow:  US: (10' total) - 3.3MHz, 1.3w/cm2 x8' continuous  Manual: (15 min)  STM to post tib tendon extending proximally into muscle belly x8'  Manual posterior talar glide grade 3-4, multiple bouts  L ankle PROM  Therex:(x20 min)  KT taping L medial ankle  Tilt baord gastroc stretch x30\"  Tilt board soleus stretch x30\"  A/P tilt board tap x30  Bosu 2x10    (HEP: tband sdly clam, standing squat w/ band ABD/ER, tband PF, and tband INV)    Charges: US, MAN, GRACE Total Timed Treatment: 45 min  Total Treatment Time: 45 min

## 2024-10-02 ENCOUNTER — IMMUNIZATION (OUTPATIENT)
Dept: LAB | Age: 74
End: 2024-10-02
Attending: EMERGENCY MEDICINE
Payer: MEDICARE

## 2024-10-02 DIAGNOSIS — Z23 NEED FOR VACCINATION: Primary | ICD-10-CM

## 2024-10-02 PROCEDURE — 90471 IMMUNIZATION ADMIN: CPT

## 2024-10-02 PROCEDURE — 90662 IIV NO PRSV INCREASED AG IM: CPT

## 2024-10-02 PROCEDURE — 90480 ADMN SARSCOV2 VAC 1/ONLY CMP: CPT

## 2024-10-03 ENCOUNTER — APPOINTMENT (OUTPATIENT)
Dept: PHYSICAL THERAPY | Facility: HOSPITAL | Age: 74
End: 2024-10-03
Attending: PODIATRIST
Payer: MEDICARE

## 2024-10-10 ENCOUNTER — OFFICE VISIT (OUTPATIENT)
Dept: PHYSICAL THERAPY | Facility: HOSPITAL | Age: 74
End: 2024-10-10
Attending: STUDENT IN AN ORGANIZED HEALTH CARE EDUCATION/TRAINING PROGRAM
Payer: MEDICARE

## 2024-10-10 PROCEDURE — 97110 THERAPEUTIC EXERCISES: CPT

## 2024-10-10 PROCEDURE — 97140 MANUAL THERAPY 1/> REGIONS: CPT

## 2024-10-10 NOTE — PROGRESS NOTES
Dx: Tendonitis (M77.9)  Posterior tibial tendon dysfunction (M76.829)  Swelling of ankle, left (M25.472)      Authorized # of visit: 10 (MDCR), no visit limit, no auth Next MD visits: none  Fall Risk: standard Precautions: none    Date: 10/10/24  Tx #: 5    Current Pain Level: 0/10 at rest (3-4/10 at worst)      Subjective: Patient reports the foot/ankle did pretty good this weekend but she was on the foot a lot one of the days cleaning up the garage and  the pain did get up to 3-4/10 that day so she took some OTC.  Was feeling better by the next day though.    Objective: Treatment per flow sheet.      Assessment: Incorporated Airex high knee march today and patient tolerates this without pain and demonstrating improved ankle stability, able to avoid excessive pronation/valgus. Patient requiring initial cuing with fsu activity with tband ER as she initially demonstrates increased knee valgus but this improves with cuing.    Plan: Continue per plan of care.    Treatment Flow:  Manual: (15 min)  STM to post tib tendon extending proximally into muscle belly x8'  Manual posterior talar glide grade 3-4, multiple bouts  L ankle PROM  Therex:(x30 min)  KT taping L medial ankle  Tilt baord gastroc stretch x30\"  Tilt board soleus stretch x30\"  A/P tilt board tap x30  A/P tilt board stabilization x1'  6\" fsu w/ RTB ER 2x10  Airex fwd lunge x20  Bosu fsu x15  Bosu 2x10    (HEP: tband sdly clam, standing squat w/ band ABD/ER, tband PF, and tband INV)    Charges: MAN, 2 GRACE Total Timed Treatment: 45 min  Total Treatment Time: 45 min

## 2024-10-17 ENCOUNTER — OFFICE VISIT (OUTPATIENT)
Dept: PHYSICAL THERAPY | Facility: HOSPITAL | Age: 74
End: 2024-10-17
Attending: STUDENT IN AN ORGANIZED HEALTH CARE EDUCATION/TRAINING PROGRAM
Payer: MEDICARE

## 2024-10-17 PROCEDURE — 97110 THERAPEUTIC EXERCISES: CPT

## 2024-10-17 PROCEDURE — 97140 MANUAL THERAPY 1/> REGIONS: CPT

## 2024-10-17 NOTE — PROGRESS NOTES
Dx: Tendonitis (M77.9)  Posterior tibial tendon dysfunction (M76.829)  Swelling of ankle, left (M25.472)      Authorized # of visit: 10 (MDCR), no visit limit, no auth Next MD visits: none  Fall Risk: standard Precautions: none    Date: 10/17/24  Tx #: 6    Current Pain Level: 0/10       Subjective: Patient reports the ankle has been doing pretty good, stating it has been feeling better since last PT session.    Objective: Treatment per flow sheet.      Assessment: Patient demonstrating decreased tenderness today throughout the medial aspect of the ankle. Patient continuing to demonstrate improved ankle control with therex.  Requires occasional verbal cuing for improved LE alignment control, preventing dynamic knee valgus, but is able to correct with minimal cuing.    Plan: Continue per plan of care.    Treatment Flow:  Manual: (15 min)  STM to post tib tendon extending proximally into muscle belly x8'  Manual posterior talar glide grade 3-4, multiple bouts  L ankle PROM  Therex:(x30 min)  KT taping L medial ankle  Tilt baord gastroc stretch x30\"  Tilt board soleus stretch x30\"  A/P tilt board tap x30  A/P tilt board stabilization x1'  6\" fsu w/ RTB ER 2x10  Airex fwd lunge x20  Airex high knee march x20  Bosu fsu x15  Bosu 2x10    (HEP: tband sdly clam, standing squat w/ band ABD/ER, tband PF, and tband INV)    Charges: MAN, 2 GRACE Total Timed Treatment: 45 min  Total Treatment Time: 45 min

## 2024-10-21 ENCOUNTER — APPOINTMENT (OUTPATIENT)
Dept: PHYSICAL THERAPY | Facility: HOSPITAL | Age: 74
End: 2024-10-21
Payer: MEDICARE

## 2024-10-24 ENCOUNTER — APPOINTMENT (OUTPATIENT)
Dept: PHYSICAL THERAPY | Facility: HOSPITAL | Age: 74
End: 2024-10-24
Attending: PODIATRIST
Payer: MEDICARE

## 2024-10-24 ENCOUNTER — APPOINTMENT (OUTPATIENT)
Dept: PHYSICAL THERAPY | Facility: HOSPITAL | Age: 74
End: 2024-10-24
Payer: MEDICARE

## 2024-10-29 ENCOUNTER — APPOINTMENT (OUTPATIENT)
Dept: PHYSICAL THERAPY | Facility: HOSPITAL | Age: 74
End: 2024-10-29
Payer: MEDICARE

## 2024-10-29 ENCOUNTER — OFFICE VISIT (OUTPATIENT)
Dept: PHYSICAL THERAPY | Facility: HOSPITAL | Age: 74
End: 2024-10-29
Attending: STUDENT IN AN ORGANIZED HEALTH CARE EDUCATION/TRAINING PROGRAM
Payer: MEDICARE

## 2024-10-29 PROCEDURE — 97110 THERAPEUTIC EXERCISES: CPT

## 2024-10-29 PROCEDURE — 97140 MANUAL THERAPY 1/> REGIONS: CPT

## 2024-11-05 ENCOUNTER — OFFICE VISIT (OUTPATIENT)
Dept: PHYSICAL THERAPY | Facility: HOSPITAL | Age: 74
End: 2024-11-05
Attending: STUDENT IN AN ORGANIZED HEALTH CARE EDUCATION/TRAINING PROGRAM
Payer: MEDICARE

## 2024-11-05 PROCEDURE — 97140 MANUAL THERAPY 1/> REGIONS: CPT

## 2024-11-05 PROCEDURE — 97530 THERAPEUTIC ACTIVITIES: CPT

## 2024-11-05 NOTE — PROGRESS NOTES
DISCHARGE SUMMARY:    Diagnosis:   Tendonitis (M77.9)  Posterior tibial tendon dysfunction (M76.829)  Swelling of ankle, left (M25.472)      Referring Provider: Berny Date of Evaluation:    9/18/2024    Precautions:   Hx of L breast CA Next MD visit:   none scheduled  Date of Surgery: n/a     PATIENT SUMMARY   Patient reports the ankle has been feeling pretty good.  She reports she hasn't had any significant pain recently and she has been able to perform her normal activity level.  States she has been able to walk the dog without the ankle giving her trouble.  Has been more aware of her LE alignment with activities such as stair negotiation and this is feeling more \"normal\".     ASSESSMENT  Patient has been progressing well throughout her course of treatment in physical therapy. Patient is demonstrating decreased swelling, improved strength, improved ROM, improved gait, improved balance, improved LE alignment control in CKC. Patient has achieved the established LTGs for PT and will be discharged to [facility] at this time.    OBJECTIVE:   Observation: foot pronation B in standing  Dynamic knee valgus is resolved bilaterally  Palpation: unremarkable  Sensation: unremarkable    AROM: (* denotes performed with pain)  Hip Knee Foot/Ankle   WNL WNL    DF: R 10; L 10  PF: R 45; L 45  INV: R 40; L 40  EV: R 15; L 15       Accessory motion: improved posterior talar glide on L    Flexibility:  Hip Flexor: tightness B  Gastroc-soleus: tightness B    Strength/MMT: (* denotes performed with pain)  Hip Knee Foot/Ankle   Flexion:NT  Extension: NT  Abduction: R 5/5; L 5/5  ER: R 5/5; L 5/5  IR: R 5/5; L 5/5 WNL    DF: R 5/5; L 5/5  PF: R 5/5; L 5/5  INV: R 5/5; L 5/5  EV: R 5/5; L 5/5       Special tests:   unremarkable    Gait: pt ambulates on level ground with improved heel to toe gait, increased step length, and improved gait speed      Today’s Treatment and Response:   Formal reassessment.  STM to medial ankle/ATFL on L. Review  of HEP.  Review of/discussion regarding self-awareness for LE alignment control with squatting/stepping, etc.  Discussion regarding footwear (patient does have new [employer] gel-keyano stability shoes)      Charges:   Man, 2 TA       Total Treatment Time: 45 min     PLAN OF CARE:    Goals: (to be met in 10 visits) ALL GOALS MET  Independent with initial HEP (1-2 visits)    Improved DF ROM >=10 deg to promote proper toe-off during gait and greater ease descending stairs without compensation (10 visits)    Increased strength throughout ankle musculature to 5/5 for improved ankle control with ADL such as prolonged gait and stair negotiation (10 visits)    Increased balance/proprioception with patient able to perform SLS on Airex foam >= 10 seconds for increased ankle stability with activities such as gait on uneven surfaces (10 visits)    Improved functional strength with patient demonstrating ability to perform step up and squat with absence of dynamic knee valgus (10 visits)    Patient independent with a comprehensive HEP (10 visits)     Frequency / Duration: DISCHARGE    LEFS Score  LEFS Score: 92.5 % (9/12/2024 10:47 AM)  LEFS Score Post: 100% (11/5/24)    Patient/Family/Caregiver was advised of these findings, precautions, and treatment options and has agreed to actively participate in planning and for this course of care.    Thank you for your referral. Please co-sign or sign and return this letter via fax as soon as possible to [phone]. If you have any questions, please contact me at Dept: [phone]    Sincerely,  Electronically signed by therapist: Yu Lafleur, PT  Physician's certification required: No

## 2024-12-02 DIAGNOSIS — E11.9 TYPE 2 DIABETES MELLITUS WITHOUT COMPLICATION, WITHOUT LONG-TERM CURRENT USE OF INSULIN (HCC): ICD-10-CM

## 2024-12-02 DIAGNOSIS — E78.2 MIXED HYPERLIPIDEMIA: ICD-10-CM

## 2024-12-02 RX ORDER — ATORVASTATIN CALCIUM 10 MG/1
10 TABLET, FILM COATED ORAL NIGHTLY
Qty: 90 TABLET | Refills: 0 | Status: SHIPPED | OUTPATIENT
Start: 2024-12-02

## 2024-12-02 RX ORDER — METFORMIN HYDROCHLORIDE 500 MG/1
1000 TABLET, EXTENDED RELEASE ORAL 2 TIMES DAILY WITH MEALS
Qty: 360 TABLET | Refills: 0 | Status: SHIPPED | OUTPATIENT
Start: 2024-12-02

## 2024-12-09 ENCOUNTER — LAB ENCOUNTER (OUTPATIENT)
Dept: LAB | Age: 74
End: 2024-12-09
Attending: STUDENT IN AN ORGANIZED HEALTH CARE EDUCATION/TRAINING PROGRAM
Payer: MEDICARE

## 2024-12-09 DIAGNOSIS — E11.9 TYPE 2 DIABETES MELLITUS WITHOUT COMPLICATION, WITHOUT LONG-TERM CURRENT USE OF INSULIN (HCC): ICD-10-CM

## 2024-12-09 DIAGNOSIS — I10 BENIGN ESSENTIAL HYPERTENSION: ICD-10-CM

## 2024-12-09 DIAGNOSIS — R79.89 ELEVATED TSH: ICD-10-CM

## 2024-12-09 LAB
ALBUMIN SERPL-MCNC: 4.4 G/DL (ref 3.2–4.8)
ALBUMIN/GLOB SERPL: 1.6 {RATIO} (ref 1–2)
ALP LIVER SERPL-CCNC: 75 U/L
ALT SERPL-CCNC: 25 U/L
ANION GAP SERPL CALC-SCNC: 7 MMOL/L (ref 0–18)
AST SERPL-CCNC: 26 U/L (ref ?–34)
BILIRUB SERPL-MCNC: 0.6 MG/DL (ref 0.2–1.1)
BUN BLD-MCNC: 21 MG/DL (ref 9–23)
CALCIUM BLD-MCNC: 10.1 MG/DL (ref 8.7–10.4)
CHLORIDE SERPL-SCNC: 104 MMOL/L (ref 98–112)
CHOLEST SERPL-MCNC: 122 MG/DL (ref ?–200)
CO2 SERPL-SCNC: 29 MMOL/L (ref 21–32)
CREAT BLD-MCNC: 1.18 MG/DL
EGFRCR SERPLBLD CKD-EPI 2021: 49 ML/MIN/1.73M2 (ref 60–?)
EST. AVERAGE GLUCOSE BLD GHB EST-MCNC: 157 MG/DL (ref 68–126)
FASTING PATIENT LIPID ANSWER: YES
FASTING STATUS PATIENT QL REPORTED: YES
GLOBULIN PLAS-MCNC: 2.7 G/DL (ref 2–3.5)
GLUCOSE BLD-MCNC: 135 MG/DL (ref 70–99)
HBA1C MFR BLD: 7.1 % (ref ?–5.7)
HDLC SERPL-MCNC: 37 MG/DL (ref 40–59)
LDLC SERPL CALC-MCNC: 52 MG/DL (ref ?–100)
NONHDLC SERPL-MCNC: 85 MG/DL (ref ?–130)
OSMOLALITY SERPL CALC.SUM OF ELEC: 295 MOSM/KG (ref 275–295)
POTASSIUM SERPL-SCNC: 4.6 MMOL/L (ref 3.5–5.1)
PROT SERPL-MCNC: 7.1 G/DL (ref 5.7–8.2)
SODIUM SERPL-SCNC: 140 MMOL/L (ref 136–145)
TRIGL SERPL-MCNC: 203 MG/DL (ref 30–149)
TSI SER-ACNC: 3.93 UIU/ML (ref 0.55–4.78)
VLDLC SERPL CALC-MCNC: 29 MG/DL (ref 0–30)

## 2024-12-09 PROCEDURE — 84443 ASSAY THYROID STIM HORMONE: CPT

## 2024-12-09 PROCEDURE — 80061 LIPID PANEL: CPT

## 2024-12-09 PROCEDURE — 36415 COLL VENOUS BLD VENIPUNCTURE: CPT

## 2024-12-09 PROCEDURE — 80053 COMPREHEN METABOLIC PANEL: CPT

## 2024-12-09 PROCEDURE — 83036 HEMOGLOBIN GLYCOSYLATED A1C: CPT

## 2024-12-11 NOTE — TELEPHONE ENCOUNTER
Last Office Visit: 12/12/24  Last Refill: 6/10/24  Return to Clinic: 6 months   Protocol: failed   NOV: n/a     Requested Prescriptions     Pending Prescriptions Disp Refills    LISINOPRIL 10 MG Oral Tab [Pharmacy Med Name: LISINOPRIL TABS 10MG] 90 tablet 1     Sig: TAKE 1 TABLET DAILY       Please approve if appropriate.     Thank you!

## 2024-12-12 ENCOUNTER — OFFICE VISIT (OUTPATIENT)
Dept: FAMILY MEDICINE CLINIC | Facility: CLINIC | Age: 74
End: 2024-12-12
Payer: MEDICARE

## 2024-12-12 VITALS
DIASTOLIC BLOOD PRESSURE: 70 MMHG | BODY MASS INDEX: 29.26 KG/M2 | TEMPERATURE: 97 F | RESPIRATION RATE: 16 BRPM | WEIGHT: 175.63 LBS | SYSTOLIC BLOOD PRESSURE: 132 MMHG | HEIGHT: 65 IN | HEART RATE: 90 BPM

## 2024-12-12 DIAGNOSIS — Z12.11 SCREENING FOR COLON CANCER: ICD-10-CM

## 2024-12-12 DIAGNOSIS — L20.84 INTRINSIC ECZEMA: ICD-10-CM

## 2024-12-12 DIAGNOSIS — N18.31 TYPE 2 DIABETES MELLITUS WITH STAGE 3A CHRONIC KIDNEY DISEASE, WITHOUT LONG-TERM CURRENT USE OF INSULIN (HCC): ICD-10-CM

## 2024-12-12 DIAGNOSIS — R92.333 HETEROGENEOUSLY DENSE TISSUE OF BOTH BREASTS ON MAMMOGRAPHY: ICD-10-CM

## 2024-12-12 DIAGNOSIS — Z98.890 HISTORY OF LUMPECTOMY OF LEFT BREAST: ICD-10-CM

## 2024-12-12 DIAGNOSIS — E78.5 DYSLIPIDEMIA DUE TO TYPE 2 DIABETES MELLITUS (HCC): ICD-10-CM

## 2024-12-12 DIAGNOSIS — E11.22 TYPE 2 DIABETES MELLITUS WITH STAGE 3A CHRONIC KIDNEY DISEASE, WITHOUT LONG-TERM CURRENT USE OF INSULIN (HCC): ICD-10-CM

## 2024-12-12 DIAGNOSIS — E11.69 DYSLIPIDEMIA DUE TO TYPE 2 DIABETES MELLITUS (HCC): ICD-10-CM

## 2024-12-12 DIAGNOSIS — Z98.41: ICD-10-CM

## 2024-12-12 DIAGNOSIS — Z87.440 HISTORY OF RECURRENT UTIS: ICD-10-CM

## 2024-12-12 DIAGNOSIS — I10 BENIGN ESSENTIAL HYPERTENSION: ICD-10-CM

## 2024-12-12 DIAGNOSIS — Z85.3 HISTORY OF LEFT BREAST CANCER: ICD-10-CM

## 2024-12-12 DIAGNOSIS — Z00.00 ENCOUNTER FOR MEDICARE ANNUAL WELLNESS EXAM: Primary | ICD-10-CM

## 2024-12-12 RX ORDER — LISINOPRIL 10 MG/1
10 TABLET ORAL DAILY
Qty: 90 TABLET | Refills: 3 | Status: SHIPPED | OUTPATIENT
Start: 2024-12-12

## 2024-12-12 NOTE — PROGRESS NOTES
Subjective:   Rosario Robb is a 74 year old female who presents for a MA AHA (Medicare Advantage Annual Health Assessment) and scheduled follow up of multiple significant but stable problems.     Med/Surg/Allergy/Fam hx updates: see below  Home: going well  Work: retired RN  Activities/Hobbies: volunteers at [facility]  Diet: working on diet  Exercise: walking dog, watches fitbit, will get many steps during the day  Sleep: good  Mood: okay  Periods: No LMP recorded. Patient is postmenopausal. No PMB  Immunizations: got flu shot and covid shot  Screenings: has had normal pap smear, going to do colonoscopy coming up    Patient's  had radiation for prostate and has left knee pain. Son is 35 and he had a PFO which required closure, clot was dissolved. He is a general surgeon in [place], still some numbness in left arm. Now on aspirin and plavix. They have a 2 year old boy and are expecting a girl in February.     Updated POA, added other son as well. He is getting  in June 2025.    Another volunteer at [facility] who was 87 passed away after a car accident and he had his stepdaughter text her.    Patient has hx of breast cancer and tried to get supportive bra, had lumpectomy on left side and setinel node removal.     A sister had ovarian cancer. Patient had genetic testing and it was negative.    Patient presents for diabetes follow up. Patient has been taking medications as prescribed. Working on diet and exercise. No low blood sugars noted. Avoids juices. Cut out ice cream. Once a while chocolate. Sometimes pretzels.  DM Meds: metFORMIN ER Tb24 - 500 MG; SITagliptin Phosphate Tabs - 100 MGMeds : Kept the same  Lab Results   Component Value Date    A1C 7.1 (H) 12/09/2024    A1C 7.0 (H) 06/05/2024    A1C 6.9 (H) 12/04/2023    A1C 6.9 (H) 05/25/2023    A1C 6.6 (H) 11/03/2022      Last Diabetic Eye Exam:  No data recorded  No data recorded    Patient presents for recheck of her hypertension. Pt has been  taking medications as instructed, no medication side effects.  BP Meds: lisinopril Tabs - 10 MG   Last Cr was 1.18 done on 12/9/2024.Last eGFR was 49 on 12/9/2024.    Patient presents for follow up of hyperlipidemia. Patient has been taking medications as prescribed, no muscle aches noted. Working on diet and exercise.   Cholesterol Meds: atorvastatin Tabs - 10 MG nightly  Cholesterol: 122, done on 12/9/2024.  HDL Cholesterol: 37, done on 12/9/2024.  LDL Cholesterol: 52, done on 12/9/2024.  TriGlycerides 203, done on 12/9/2024.    Taking meloxicam for pain.    Wt Readings from Last 6 Encounters:   12/12/24 175 lb 9.6 oz (79.7 kg)   06/10/24 174 lb (78.9 kg)   12/07/23 173 lb (78.5 kg)   06/08/23 174 lb (78.9 kg)   11/10/22 172 lb (78 kg)   06/30/22 171 lb (77.6 kg)       History/Other:   Fall Risk Assessment:   She has been screened for Falls and is low risk.      Cognitive Assessment:   She had a completely normal cognitive assessment - see flowsheet entries     Functional Ability/Status:   Rosario Robb has a completely normal functional assessment. See flowsheet for details.      Depression Screening (PHQ):  PHQ-2 SCORE: 0  , done 12/5/2024       Advanced Directives:   She has a Living Will on file in [employer]; reviewed and discussed documents with patient (and family/surrogate if present).  She has a Power of  for Health Care on file in [facility].  Discussed Advance Care Planning with patient (and family/surrogate if present). Standard forms made available to patient in After Visit Summary.      Patient Active Problem List   Diagnosis    Type 2 diabetes mellitus (HCC)    Hyperlipidemia    Benign essential hypertension    Intrinsic eczema    Combined forms of age-related cataract, left eye    Diverticulosis of colon    Epiretinal membrane    Stage 3a chronic kidney disease (HCC)    S/P laser cataract surgery, right    History of recurrent UTIs    History of left breast cancer    Dyslipidemia due to type 2  diabetes mellitus (HCC)     Allergies:  She has No Known Allergies.    Current Medications:  Outpatient Medications Marked as Taking for the 24 encounter (Office Visit) with Deysi Joel MD   Medication Sig    atorvastatin 10 MG Oral Tab TAKE 1 TABLET NIGHTLY    metFORMIN  MG Oral Tablet 24 Hr TAKE 2 TABLETS TWICE A DAY WITH MEALS    Meloxicam 7.5 MG Oral Tab Take 1 tablet (7.5 mg total) by mouth daily.    lisinopril 10 MG Oral Tab Take 1 tablet (10 mg total) by mouth daily.    SITagliptin Phosphate (JANUVIA) 100 MG Oral Tab Take 1 tablet (100 mg total) by mouth daily.    clobetasol 0.05 % External Solution APPLY TOPICALLY TO AFFECTED AREA(S) NIGHTLY    Glucose Blood In Vitro Strip TEST  DAILY E11.9 type 2 DM    Ascorbic Acid (VITAMIN C) 1000 MG Oral Tab Take 1 tablet (1,000 mg total) by mouth daily.    Cranberry 250 MG Oral Cap Take by mouth.    Aspirin 81 MG Oral Tab Take 1 tablet (81 mg total) by mouth daily.    Omega-3 Fatty Acids (FISH OIL) 1000 MG Oral Cap Take 1,000 mg by mouth daily.       Medical History:  She  has a past medical history of Breast CA (HCC) (2002), Bronchitis with bronchospasm (2018), Cancer (HCC) (See left breast cancer ), Diabetes (HCC) (), Essential hypertension, History of mammogram (2012), Leukocytoclastic vasculitis (HCC), and Malignant neoplasm of female breast (HCC) (2012).  Surgical History:  She  has a past surgical history that includes revise median n/carpal tunnel surg (2007); lumpectomy left (2002); chemotherapy (2002); radiation left (2002); cataract (right eye ); colonoscopy ();  (,); and other surgical history (left breast lumpectomy, axillary lymph node ,).   Family History:  Her family history includes Breast Cancer (age of onset: 50) in her self; Cancer in her mother, sister, and sister; Cancer (age of onset: 52) in her father; Ovarian Cancer (age of onset: 46) in her  sister.  Social History:  She  reports that she has never smoked. She has never used smokeless tobacco. She reports current alcohol use of about 2.0 standard drinks of alcohol per week. She reports that she does not use drugs.    Tobacco:  She has never smoked tobacco.    CAGE Alcohol Screen:   CAGE screening score of 0 on 12/5/2024, showing low risk of alcohol abuse.      Patient Care Team:  Deysi Joel MD as PCP - General (Family Medicine)  Dressler, Stephanie, PA-C (Physician Assistant)  Yu Lafleur PT as Physical Therapist    Review of Systems  GENERAL: feels well otherwise  SKIN: denies any unusual skin lesions  EYES: denies blurred vision or double vision  HEENT: denies nasal congestion, sinus pain or ST  LUNGS: denies shortness of breath with exertion  CARDIOVASCULAR: denies chest pain on exertion  GI: denies abdominal pain, denies heartburn  : denies dysuria, vaginal discharge or itching, no complaint of urinary incontinence   MUSCULOSKELETAL: denies back pain  NEURO: denies headaches  PSYCHE: denies depression or anxiety  HEMATOLOGIC: denies hx of anemia  ENDOCRINE: denies thyroid history  ALL/ASTHMA: denies hx of allergy or asthma    Objective:   Physical Exam  General Appearance:  Alert, cooperative, no distress, appears stated age   Head:  Normocephalic, without obvious abnormality, atraumatic   Eyes:  PERRL, conjunctiva/corneas clear, EOM's intact both eyes   Ears:  Normal TM's and external ear canals, both ears   Nose: Nares normal, septum midline,mucosa normal, no drainage   Throat: Lips, mucosa, and tongue normal; teeth and gums normal   Neck: Supple, symmetrical, trachea midline, no adenopathy;  thyroid: not enlarged, symmetric, no tenderness/mass/nodules; no JVD   Back:   Symmetric, no curvature, ROM normal   Lungs:   Clear to auscultation bilaterally, respirations unlabored   Heart:  Regular rate and rhythm, S1 and S2 normal, no murmur, rub, or gallop   Abdomen:   Soft,  non-tender, bowel sounds active all four quadrants,  no masses, no organomegaly   Pelvic: Deferred   Extremities: Extremities normal, atraumatic, no cyanosis or edema   Pulses: 2+ and symmetric   Skin: Skin color, texture, turgor normal, no rashes or lesions   Lymph nodes: Cervical nodes normal   Neurologic: Normal   Breast: no dominant or suspicious mass, no nipple discharge      /70   Pulse 90   Temp 97 °F (36.1 °C) (Temporal)   Resp 16   Ht 5' 5\" (1.651 m)   Wt 175 lb 9.6 oz (79.7 kg)   BMI 29.22 kg/m²  Estimated body mass index is 29.22 kg/m² as calculated from the following:    Height as of this encounter: 5' 5\" (1.651 m).    Weight as of this encounter: 175 lb 9.6 oz (79.7 kg).    Medicare Hearing Assessment:   Hearing Screening    Time taken: 12/12/2024  7:28 AM  Screening Method: Finger Rub  Finger Rub Result: Pass         Visual Acuity:   Right Eye Visual Acuity: Uncorrected Right Eye Chart Acuity: 20/70   Left Eye Visual Acuity: Uncorrected Left Eye Chart Acuity: 20/30   Both Eyes Visual Acuity: Uncorrected Both Eyes Chart Acuity: 20/30   Able To Tolerate Visual Acuity: Yes        Assessment & Plan:   Rosario Robb is a 74 year old female who presents for a Medicare Assessment.     1. Encounter for Medicare annual wellness exam (Primary)  2. Screening for colon cancer  -     Referral to [facility] for Colon[facility] **Select a Doc to add to AVS**  3. Type 2 diabetes mellitus with stage 3a chronic kidney disease, without long-term current use of insulin (HCC)  Patient presents for diabetes follow up  - doing well  - will continue current regimen  - keep up with eye exam  - follow up in 6mo with labs   Overview:  Metformin 1000mg ER BID  Januvia 100mg daily  Orders:  -     Comp Metabolic Panel (14); Future; Expected date: 06/10/2025  -     Lipid Panel; Future; Expected date: 06/10/2025  -     Hemoglobin A1C; Future; Expected date: 06/10/2025  -     Microalb/Creat Ratio, Random Urine; Future;  Expected date: 06/10/2025  -     CBC With Differential With Platelet; Future; Expected date: 06/12/2025  4. Dyslipidemia due to type 2 diabetes mellitus (HCC)  Patient presents for follow up of HLD  - no side effects of medications  - will continue current regimen  - low fat diet and exercise  - follow up in 6mo or sooner if needed  -     Comp Metabolic Panel (14); Future; Expected date: 06/10/2025  -     Lipid Panel; Future; Expected date: 06/10/2025  -     CBC With Differential With Platelet; Future; Expected date: 06/12/2025  5. Benign essential hypertension  Pt presents for follow up of HTN  - no red flag symptoms  - BP controlled  - continue current regimen  - RTC 6mo or sooner if needed  Overview:  Lisinopril 10mg daily  Orders:  -     Comp Metabolic Panel (14); Future; Expected date: 06/10/2025  -     Lipid Panel; Future; Expected date: 06/10/2025  -     CBC With Differential With Platelet; Future; Expected date: 06/12/2025  6. History of recurrent UTIs  Continue cranberry supplement  Stay hydrated  -     CBC With Differential With Platelet; Future; Expected date: 06/12/2025  7. History of left breast cancer  Will follow up with MRI breast to follow given hx  Overview:  S/p lumpectomy 2002, with chemo and radiation due to 1 sentinel lymph node +  Orders:  -     DME - External  -     MRI BREAST (W+WO) W/CAD BILAT (CPT=77049); Future; Expected date: 01/27/2025  -     CBC With Differential With Platelet; Future; Expected date: 06/12/2025  8. History of lumpectomy of left breast  Will put in order for breast support garment  -     DME - External  -     MRI BREAST (W+WO) W/CAD BILAT (CPT=77049); Future; Expected date: 01/27/2025  -     CBC With Differential With Platelet; Future; Expected date: 06/12/2025  9. Heterogeneously dense tissue of both breasts on mammography  Patient also has dense breast tissue  -     MRI BREAST (W+WO) W/CAD BILAT (CPT=77049); Future; Expected date: 01/27/2025  10. S/P laser cataract  surgery, right  11. Intrinsic eczema  Doing well  Overview:  Clobetasol 0.05% as needed    The patient indicates understanding of these issues and agrees to the plan.  Reinforced healthy diet, lifestyle, and exercise.      Return in 6 months (on 6/12/2025) for medication follow up, or sooner if needed.     Deysi Joel MD, 12/12/2024     Supplementary Documentation:   General Health:  In the past six months, have you lost more than 10 pounds without trying?: (Patient-Rptd) 2 - No  Has your appetite been poor?: (Patient-Rptd) No  Type of Diet: (Patient-Rptd) Balanced;Diabetic  How does the patient maintain a good energy level?: (Patient-Rptd) Daily Walks  How would you describe your daily physical activity?: (Patient-Rptd) Moderate  How would you describe your current health state?: (Patient-Rptd) Good  How do you maintain positive mental well-being?: (Patient-Rptd) Social Interaction;Puzzles;Visiting Friends;Visiting Family  On a scale of 0 to 10, with 0 being no pain and 10 being severe pain, what is your pain level?: (Patient-Rptd) 1 - (Mild)  In the past six months, have you experienced urine leakage?: (Patient-Rptd) 0-No  At any time do you feel concerned for the safety/well-being of yourself and/or your children, in your home or elsewhere?: (Patient-Rptd) No  Have you had any immunizations at another office such as Influenza, Hepatitis B, Tetanus, or Pneumococcal?: (Patient-Rptd) No    Health Maintenance   Topic Date Due    MA Annual Health Assessment  01/01/2024    Diabetes Care Dilated Eye Exam  08/03/2024    Colorectal Cancer Screening  09/22/2024    Diabetes Care: Microalb/Creat Ratio  06/05/2025    Diabetes Care A1C  06/09/2025    Diabetes Care Foot Exam  06/10/2025    Mammogram  07/27/2025    Diabetes Care: GFR  12/09/2025    Influenza Vaccine  Completed    DEXA Scan  Completed    Annual Depression Screening  Completed    Fall Risk Screening (Annual)  Completed    Pneumococcal Vaccine: 65+ Years   Completed    Zoster Vaccines  Completed    COVID-19 Vaccine  Completed

## 2024-12-17 ENCOUNTER — MED REC SCAN ONLY (OUTPATIENT)
Dept: FAMILY MEDICINE CLINIC | Facility: CLINIC | Age: 74
End: 2024-12-17

## 2025-01-12 DIAGNOSIS — E11.9 TYPE 2 DIABETES MELLITUS WITHOUT COMPLICATION, WITHOUT LONG-TERM CURRENT USE OF INSULIN (HCC): ICD-10-CM

## 2025-01-13 RX ORDER — SITAGLIPTIN 100 MG/1
100 TABLET, FILM COATED ORAL DAILY
Qty: 90 TABLET | Refills: 1 | Status: SHIPPED | OUTPATIENT
Start: 2025-01-13

## 2025-01-28 ENCOUNTER — HOSPITAL ENCOUNTER (OUTPATIENT)
Dept: ULTRASOUND IMAGING | Age: 75
Discharge: HOME | End: 2025-01-28
Attending: INTERNAL MEDICINE
Payer: MEDICARE

## 2025-01-28 DIAGNOSIS — R74.01 ELEVATED ALT MEASUREMENT: ICD-10-CM

## 2025-01-28 PROCEDURE — 76700 US EXAM ABDOM COMPLETE: CPT | Performed by: INTERNAL MEDICINE

## 2025-03-03 DIAGNOSIS — E11.9 TYPE 2 DIABETES MELLITUS WITHOUT COMPLICATION, WITHOUT LONG-TERM CURRENT USE OF INSULIN (HCC): ICD-10-CM

## 2025-03-03 DIAGNOSIS — E78.2 MIXED HYPERLIPIDEMIA: ICD-10-CM

## 2025-03-05 RX ORDER — METFORMIN HYDROCHLORIDE 500 MG/1
1000 TABLET, EXTENDED RELEASE ORAL 2 TIMES DAILY WITH MEALS
Qty: 360 TABLET | Refills: 3 | Status: SHIPPED | OUTPATIENT
Start: 2025-03-05

## 2025-03-05 RX ORDER — ATORVASTATIN CALCIUM 10 MG/1
10 TABLET, FILM COATED ORAL NIGHTLY
Qty: 90 TABLET | Refills: 3 | Status: SHIPPED | OUTPATIENT
Start: 2025-03-05

## 2025-03-05 NOTE — TELEPHONE ENCOUNTER
Last office visit: 12/12/24  Next office visit: RTC in 6 months (on 6/12/2025)   Last Refill:12/2/24    Requested Prescriptions     Pending Prescriptions Disp Refills    METFORMIN  MG Oral Tablet 24 Hr [Pharmacy Med Name: METFORMIN HCL ER TABS 500MG] 360 tablet 3     Sig: TAKE 2 TABLETS TWICE A DAY WITH MEALS    ATORVASTATIN 10 MG Oral Tab [Pharmacy Med Name: ATORVASTATIN TABS 10MG] 90 tablet 3     Sig: TAKE 1 TABLET NIGHTLY

## 2025-03-24 PROBLEM — D12.3 BENIGN NEOPLASM OF TRANSVERSE COLON: Status: ACTIVE | Noted: 2025-03-24

## 2025-03-24 PROBLEM — Z12.11 SPECIAL SCREENING FOR MALIGNANT NEOPLASM OF COLON: Status: ACTIVE | Noted: 2025-03-24

## 2025-03-24 NOTE — TELEPHONE ENCOUNTER
CRYOTHERAPY/ LIQUID NITROGEN THERAPY      Cryotherapy is used to treat warts, seborrheic keratoses, actinic keratoses, and other benign and pre-malignant lesions.    Cryotherapy destroys tissue by direct freezing and thawing of skin lesions.  Larger lesions may require multiple treatments or longer freezing and thawing cycles.      Side effects include: Mild to moderate pain      Redness around the areas        Blisters      Numbness      Mild scarring      Local pigment changes      If you develop blisters, you may:    Apply Vaseline or Aquaphor and a bandaid, or    Sterilize a needle with heat or rubbing alcohol and puncture the blister.    Call the clinic if you develop:  Blisters that do not heal within 4 weeks    Bleeding profusely that doesn't stop with pressure    Open sores that do not heal    Open sores that have draining pus, spreading redness, tenderness, or           warmth     Return to clinic if lesion persists beyond 1 month          For any concerns, call the Dermatology clinic at:      [phone]      Pt made 6 mo f/up appt in June 2018. She would like orders placed for blood work to do prior to appt. Also she needs refill of     lisinopril 5 MG Oral Tab  And  METFORMIN HCL  MG Oral Tablet 24 Hr    Sent to [facility]. Please advise.  Akua Corona

## 2025-04-15 ENCOUNTER — PATIENT MESSAGE (OUTPATIENT)
Dept: FAMILY MEDICINE CLINIC | Facility: CLINIC | Age: 75
End: 2025-04-15

## 2025-05-01 ENCOUNTER — TELEPHONE (OUTPATIENT)
Dept: FAMILY MEDICINE CLINIC | Facility: CLINIC | Age: 75
End: 2025-05-01

## 2025-05-01 NOTE — TELEPHONE ENCOUNTER
Patient scheduled for MRI on 5/10. She wants to know if there is an order placed since she has not received any letter from [facility] and wants to know if the insurance will cover the test. Informed patient the order is in the system and advised to call her insurance to check if covered and if they need the order faxed to them, provide us with the fax number. Patient verbalized understanding, will call her insurance and will call back.

## 2025-06-04 ENCOUNTER — TELEPHONE (OUTPATIENT)
Dept: FAMILY MEDICINE CLINIC | Facility: CLINIC | Age: 75
End: 2025-06-04

## 2025-06-04 ENCOUNTER — TELEPHONE (OUTPATIENT)
Dept: ORTHOPEDICS CLINIC | Facility: CLINIC | Age: 75
End: 2025-06-04

## 2025-06-04 DIAGNOSIS — G89.29 CHRONIC PAIN OF LEFT ANKLE: Primary | ICD-10-CM

## 2025-06-04 DIAGNOSIS — M79.671 PAIN OF RIGHT HEEL: Primary | ICD-10-CM

## 2025-06-04 DIAGNOSIS — M25.572 CHRONIC PAIN OF LEFT ANKLE: Primary | ICD-10-CM

## 2025-06-04 NOTE — TELEPHONE ENCOUNTER
Pt requesting referral for the below specialist:    Specialty:Podiatric Surgery  Provider/Specialist:Hermila Arrieta DPM  Address:[address] [place] [address]#[phone]  Fax#    Reason/Symptoms:    Has pt seen PCP for this condition? (Y/N):  yes  Insurance:   Medicare advantage

## 2025-06-04 NOTE — TELEPHONE ENCOUNTER
LOV: 12/12/24 Annual physical    NOV: 12/15/25 MA Supervisit    Last podiatry referral was given on 6/10/2 to Dr Hermila Arrieta.     Called and informed the patient that new referral has been placed for podiatrist. She verbalized an understanding.

## 2025-06-04 NOTE — TELEPHONE ENCOUNTER
XR ordered and scheduled per Ortho protocol.   Sent patient message via [employer] to inform them and ask them to arrive 15-20 minutes prior to appointment with .

## 2025-06-04 NOTE — TELEPHONE ENCOUNTER
Patient scheduled on [facility] for pain in right heel. Please advise if imaging is needed  Future Appointments   Date Time Provider Department Center   6/26/2025  9:20 AM Hermila Arrieta DPM [facility]   12/15/2025  8:00 AM Deysi Joel MD [id number]

## 2025-06-13 ENCOUNTER — HOSPITAL ENCOUNTER (OUTPATIENT)
Dept: GENERAL RADIOLOGY | Age: 75
Discharge: HOME | End: 2025-06-13
Attending: PODIATRIST
Payer: MEDICARE

## 2025-06-13 DIAGNOSIS — M79.671 PAIN OF RIGHT HEEL: ICD-10-CM

## 2025-06-13 PROCEDURE — 73650 X-RAY EXAM OF HEEL: CPT | Performed by: PODIATRIST

## 2025-06-26 ENCOUNTER — OFFICE VISIT (OUTPATIENT)
Dept: ORTHOPEDICS CLINIC | Facility: CLINIC | Age: 75
End: 2025-06-26
Payer: MEDICARE

## 2025-06-26 VITALS — HEIGHT: 65.5 IN | BODY MASS INDEX: 28.64 KG/M2 | WEIGHT: 174 LBS

## 2025-06-26 DIAGNOSIS — M76.829 POSTERIOR TIBIAL TENDON DYSFUNCTION: ICD-10-CM

## 2025-06-26 DIAGNOSIS — M77.9 TENDONITIS: ICD-10-CM

## 2025-06-26 DIAGNOSIS — M77.30 CALCANEAL SPUR, UNSPECIFIED LATERALITY: ICD-10-CM

## 2025-06-26 DIAGNOSIS — M72.2 PLANTAR FASCIITIS: Primary | ICD-10-CM

## 2025-06-26 PROCEDURE — 1159F MED LIST DOCD IN RCRD: CPT | Performed by: PODIATRIST

## 2025-06-26 PROCEDURE — 99214 OFFICE O/P EST MOD 30 MIN: CPT | Performed by: PODIATRIST

## 2025-06-26 PROCEDURE — 1125F AMNT PAIN NOTED PAIN PRSNT: CPT | Performed by: PODIATRIST

## 2025-06-26 PROCEDURE — 1160F RVW MEDS BY RX/DR IN RCRD: CPT | Performed by: PODIATRIST

## 2025-06-26 RX ORDER — CEPHALEXIN 500 MG/1
500 CAPSULE ORAL 2 TIMES DAILY
Qty: 14 CAPSULE | Refills: 0 | Status: SHIPPED | OUTPATIENT
Start: 2025-06-26 | End: 2025-07-03

## 2025-06-26 NOTE — PROGRESS NOTES
[facility] Progress Note    Subjective:   Rosario is here with a new issue today  About 2 weeks of right heel pain.  No injury    Am pain  Walking in back yard and turned funny    Hx of post tib tendonitis - I last saw her about 1 year ago.  She went through PT and it still does bother her at times.  She tries to wear good shoes and arch supports.        Objective:     Exam right foot  Pt has pain at plantar fascial insertion area of heel.  No pain with side to side compression of calcaneus.  No swelling.  Negative tinel's sign at medial ankle.      Left foot -mild swelling mild warmth mild insertional posterior tibial tendon pain.  Upon stance she has subtle flattening a little worse on the left foot otherwise pretty equal arches, flexible flatfoot        Imaging: X-rays show calcaneal spur-x-rays of right foot        Assessment/Plan:     Diagnoses and all orders for this visit:    Plantar fasciitis  -     Physical Therapy Referral - [facility]    Calcaneal spur, unspecified laterality  -     Physical Therapy Referral - [facility]    Posterior tibial tendon dysfunction  -     Physical Therapy Referral - [facility]    Tendonitis  -     Physical Therapy Referral - [facility]    Other orders  -     cephALEXin 500 MG Oral Cap; Take 1 capsule (500 mg total) by mouth 2 (two) times daily for 7 days.      She has plantar fasciitis and a heel spur of the right foot  Condition of plantar fascitis (etiology, anatomy) and treatment plan were discussed, including stretching, ice, massage, good shoegear, arch support and consideration of a series of 3 steroid injections.  Physical therapy also discussed.  Consider custom orthotics.      She continues to have tendinitis off-and-on of the left foot, posterior tibial tendon with early dysfunction.  She is always at a risk of tear.    For both issues continued physical therapy  Shoes and arch supports stressed no barefoot    Consider ultrasound study  with Dr. Gregorio for either issue if she is not improved        CARLEEN PatelM  [facility]    [employer] speech recognition software was used to prepare this note. If a word or phrase is confusing, it is likely do to a failure of recognition. Please contact me with any questions or clarifications.

## 2025-07-10 DIAGNOSIS — E11.9 TYPE 2 DIABETES MELLITUS WITHOUT COMPLICATION, WITHOUT LONG-TERM CURRENT USE OF INSULIN (HCC): ICD-10-CM

## 2025-07-14 NOTE — TELEPHONE ENCOUNTER
Patient is overdue for a med check, please call to schedule with Eve White. Then route to Dr. Joel. Thank you!     Last Office Visit: 12/12/24  Last Refill: 1/13/25  Return to Clinic: 6 months   Protocol: failed   NOV: 12/15/25  Requested Prescriptions     Pending Prescriptions Disp Refills    SITagliptin Phosphate (JANUVIA) 100 MG Oral Tab [Pharmacy Med Name: JANUVIA TABS 100MG] 90 tablet 0     Sig: TAKE 1 TABLET DAILY         Please approve if appropriate.     Thank you!

## 2025-07-21 ENCOUNTER — LAB ENCOUNTER (OUTPATIENT)
Dept: LAB | Age: 75
End: 2025-07-21
Attending: STUDENT IN AN ORGANIZED HEALTH CARE EDUCATION/TRAINING PROGRAM
Payer: MEDICARE

## 2025-07-21 DIAGNOSIS — Z98.890 HISTORY OF LUMPECTOMY OF LEFT BREAST: ICD-10-CM

## 2025-07-21 DIAGNOSIS — N18.31 TYPE 2 DIABETES MELLITUS WITH STAGE 3A CHRONIC KIDNEY DISEASE, WITHOUT LONG-TERM CURRENT USE OF INSULIN (HCC): ICD-10-CM

## 2025-07-21 DIAGNOSIS — E78.5 DYSLIPIDEMIA DUE TO TYPE 2 DIABETES MELLITUS (HCC): ICD-10-CM

## 2025-07-21 DIAGNOSIS — I10 BENIGN ESSENTIAL HYPERTENSION: ICD-10-CM

## 2025-07-21 DIAGNOSIS — E11.69 DYSLIPIDEMIA DUE TO TYPE 2 DIABETES MELLITUS (HCC): ICD-10-CM

## 2025-07-21 DIAGNOSIS — Z87.440 HISTORY OF RECURRENT UTIS: ICD-10-CM

## 2025-07-21 DIAGNOSIS — E11.22 TYPE 2 DIABETES MELLITUS WITH STAGE 3A CHRONIC KIDNEY DISEASE, WITHOUT LONG-TERM CURRENT USE OF INSULIN (HCC): ICD-10-CM

## 2025-07-21 DIAGNOSIS — Z85.3 HISTORY OF LEFT BREAST CANCER: ICD-10-CM

## 2025-07-21 LAB
ALBUMIN SERPL-MCNC: 4.7 G/DL (ref 3.2–4.8)
ALBUMIN/GLOB SERPL: 1.9 {RATIO} (ref 1–2)
ALP LIVER SERPL-CCNC: 81 U/L (ref 55–142)
ALT SERPL-CCNC: 23 U/L (ref 10–49)
ANION GAP SERPL CALC-SCNC: 7 MMOL/L (ref 0–18)
AST SERPL-CCNC: 24 U/L (ref ?–34)
BASOPHILS # BLD AUTO: 0.09 X10(3) UL (ref 0–0.2)
BASOPHILS NFR BLD AUTO: 1.4 %
BILIRUB SERPL-MCNC: 0.4 MG/DL (ref 0.2–1.1)
BUN BLD-MCNC: 20 MG/DL (ref 9–23)
CALCIUM BLD-MCNC: 9.8 MG/DL (ref 8.7–10.6)
CHLORIDE SERPL-SCNC: 103 MMOL/L (ref 98–112)
CHOLEST SERPL-MCNC: 106 MG/DL (ref ?–200)
CO2 SERPL-SCNC: 28 MMOL/L (ref 21–32)
CREAT BLD-MCNC: 1.21 MG/DL (ref 0.55–1.02)
CREAT UR-SCNC: 56.8 MG/DL
EGFRCR SERPLBLD CKD-EPI 2021: 48 ML/MIN/1.73M2 (ref 60–?)
EOSINOPHIL # BLD AUTO: 0.48 X10(3) UL (ref 0–0.7)
EOSINOPHIL NFR BLD AUTO: 7.6 %
ERYTHROCYTE [DISTWIDTH] IN BLOOD BY AUTOMATED COUNT: 12.8 %
EST. AVERAGE GLUCOSE BLD GHB EST-MCNC: 151 MG/DL (ref 68–126)
FASTING PATIENT LIPID ANSWER: YES
FASTING STATUS PATIENT QL REPORTED: YES
GLOBULIN PLAS-MCNC: 2.5 G/DL (ref 2–3.5)
GLUCOSE BLD-MCNC: 153 MG/DL (ref 70–99)
HBA1C MFR BLD: 6.9 % (ref ?–5.7)
HCT VFR BLD AUTO: 39.7 % (ref 35–48)
HDLC SERPL-MCNC: 35 MG/DL (ref 40–59)
HGB BLD-MCNC: 12.6 G/DL (ref 12–16)
IMM GRANULOCYTES # BLD AUTO: 0.01 X10(3) UL (ref 0–1)
IMM GRANULOCYTES NFR BLD: 0.2 %
LDLC SERPL CALC-MCNC: 42 MG/DL (ref ?–100)
LYMPHOCYTES # BLD AUTO: 2.21 X10(3) UL (ref 1–4)
LYMPHOCYTES NFR BLD AUTO: 35.1 %
MCH RBC QN AUTO: 28.3 PG (ref 26–34)
MCHC RBC AUTO-ENTMCNC: 31.7 G/DL (ref 31–37)
MCV RBC AUTO: 89.2 FL (ref 80–100)
MICROALBUMIN UR-MCNC: <0.3 MG/DL
MONOCYTES # BLD AUTO: 0.46 X10(3) UL (ref 0.1–1)
MONOCYTES NFR BLD AUTO: 7.3 %
NEUTROPHILS # BLD AUTO: 3.04 X10 (3) UL (ref 1.5–7.7)
NEUTROPHILS # BLD AUTO: 3.04 X10(3) UL (ref 1.5–7.7)
NEUTROPHILS NFR BLD AUTO: 48.4 %
NONHDLC SERPL-MCNC: 71 MG/DL (ref ?–130)
OSMOLALITY SERPL CALC.SUM OF ELEC: 292 MOSM/KG (ref 275–295)
PLATELET # BLD AUTO: 283 10(3)UL (ref 150–450)
POTASSIUM SERPL-SCNC: 4.9 MMOL/L (ref 3.5–5.1)
PROT SERPL-MCNC: 7.2 G/DL (ref 5.7–8.2)
RBC # BLD AUTO: 4.45 X10(6)UL (ref 3.8–5.3)
SODIUM SERPL-SCNC: 138 MMOL/L (ref 136–145)
TRIGL SERPL-MCNC: 177 MG/DL (ref 30–149)
VLDLC SERPL CALC-MCNC: 25 MG/DL (ref 0–30)
WBC # BLD AUTO: 6.3 X10(3) UL (ref 4–11)

## 2025-07-21 PROCEDURE — 82570 ASSAY OF URINE CREATININE: CPT

## 2025-07-21 PROCEDURE — 80061 LIPID PANEL: CPT

## 2025-07-21 PROCEDURE — 36415 COLL VENOUS BLD VENIPUNCTURE: CPT

## 2025-07-21 PROCEDURE — 85025 COMPLETE CBC W/AUTO DIFF WBC: CPT

## 2025-07-21 PROCEDURE — 83036 HEMOGLOBIN GLYCOSYLATED A1C: CPT

## 2025-07-21 PROCEDURE — 80053 COMPREHEN METABOLIC PANEL: CPT

## 2025-07-21 PROCEDURE — 82043 UR ALBUMIN QUANTITATIVE: CPT

## 2025-07-24 ENCOUNTER — OFFICE VISIT (OUTPATIENT)
Dept: FAMILY MEDICINE CLINIC | Facility: CLINIC | Age: 75
End: 2025-07-24
Payer: MEDICARE

## 2025-07-24 VITALS
BODY MASS INDEX: 27.82 KG/M2 | WEIGHT: 169 LBS | SYSTOLIC BLOOD PRESSURE: 120 MMHG | RESPIRATION RATE: 18 BRPM | DIASTOLIC BLOOD PRESSURE: 72 MMHG | HEART RATE: 76 BPM | HEIGHT: 65.5 IN | TEMPERATURE: 97 F | OXYGEN SATURATION: 100 %

## 2025-07-24 DIAGNOSIS — Z12.31 VISIT FOR SCREENING MAMMOGRAM: ICD-10-CM

## 2025-07-24 DIAGNOSIS — E78.2 MIXED HYPERLIPIDEMIA: Primary | ICD-10-CM

## 2025-07-24 DIAGNOSIS — E11.9 TYPE 2 DIABETES MELLITUS WITHOUT COMPLICATION, WITHOUT LONG-TERM CURRENT USE OF INSULIN (HCC): ICD-10-CM

## 2025-07-24 DIAGNOSIS — H69.93 DYSFUNCTION OF BOTH EUSTACHIAN TUBES: ICD-10-CM

## 2025-07-24 PROCEDURE — 1159F MED LIST DOCD IN RCRD: CPT

## 2025-07-24 PROCEDURE — 99214 OFFICE O/P EST MOD 30 MIN: CPT

## 2025-07-24 PROCEDURE — 1160F RVW MEDS BY RX/DR IN RCRD: CPT

## 2025-07-24 PROCEDURE — 1170F FXNL STATUS ASSESSED: CPT

## 2025-07-24 PROCEDURE — G2211 COMPLEX E/M VISIT ADD ON: HCPCS

## 2025-07-24 RX ORDER — SITAGLIPTIN 100 MG/1
100 TABLET, FILM COATED ORAL DAILY
Qty: 90 TABLET | Refills: 0 | OUTPATIENT
Start: 2025-07-24

## 2025-07-24 RX ORDER — FLUTICASONE PROPIONATE 50 MCG
2 SPRAY, SUSPENSION (ML) NASAL DAILY
Qty: 1 EACH | Refills: 1 | Status: SHIPPED | OUTPATIENT
Start: 2025-07-24

## 2025-07-24 NOTE — PROGRESS NOTES
[facility] Office Note  Chief Complaint:   Chief Complaint   Patient presents with    Medication Follow-Up     Here for med check        HPI:   This is a 74 year old female coming in for a medication check.  Patient is due for refills of her Januvia.  Denies any complaints at this time other than occasional crackling in her ears.  Patient also needs an order for mammogram.      Past Medical History[1]  Past Surgical History[2]  Social History:  Short Social Hx on File[3]  Family History:  Family History[4]  Allergies:  Allergies[5]  Current Meds:  Current Medications[6]   Counseling given: Not Answered  Tobacco comments: never smoked       REVIEW OF SYSTEMS:   ROS:  CONSTITUTIONAL:  Denies any unusual weight gain/loss, fever, chills, weakness or fatigue.  HEENT:  Eyes:  Denies visual loss, blurred vision, double vision or yellow sclerae. Ears, Nose, Throat:  Denies hearing loss, sneezing, congestion, runny nose or sore throat.  Complains of crackling in bilateral ears.  SKIN:  No rash or itching.  CARDIOVASCULAR:  Denies chest pain, chest pressure or chest discomfort. No palpitations or edema.  Denies any dyspnea on exertion or at rest  RESPIRATORY:  Denies shortness of breath, cough  GASTROINTESTINAL:  Denies any abdominal pain, nausea, vomiting, constipation, diarrhea, or blood in stool.  NEUROLOGICAL:  Denies headache, dizziness, syncope, numbness or tingling in the extremities.  MUSCULOSKELETAL:  Denies muscle, back pain, joint pain or stiffness.      EXAM:   /72   Pulse 76   Temp 97.2 °F (36.2 °C) (Temporal)   Resp 18   Ht 5' 5.5\" (1.664 m)   Wt 169 lb (76.7 kg)   SpO2 100%   BMI 27.70 kg/m²  Estimated body mass index is 27.7 kg/m² as calculated from the following:    Height as of this encounter: 5' 5.5\" (1.664 m).    Weight as of this encounter: 169 lb (76.7 kg).   Vital signs reviewed.Appears stated age, well groomed.  Physical Exam:  GEN:  Patient is alert and oriented  x3, no apparent distress  HEAD:  Normocephalic, atraumatic  HEENT:  Eyes: EOMI, PERRLA, no scleral icterus, conjunctivae clear bilaterally.  Ears: TM's clear and visible bilaterally, mild bilateral nonsuppurative effusions with some excess cerumen on the right. Throat:  No tonsillar erythema or exudate.  Mouth:  No oral lesions or ulcerations, no dental abnormalities noted.  LUNGS: clear to auscultation bilaterally, no rales/rhonchi/wheezing  HEART:  Regular rate and rhythm, no murmurs, rubs or gallops  EXTREMITIES:  Strength intact with 5/5 bilaterally upper and lower extremities, no edema noted  NEURO:  CN 2 - 12 grossly intact     ASSESSMENT AND PLAN:   1. Type 2 diabetes mellitus without complication, without long-term current use of insulin (HCC)  Last hemoglobin A1c was 6.9.  Januvia refilled.  Will repeat A1c in 6 months.  - SITagliptin Phosphate (JANUVIA) 100 MG Oral Tab; Take 1 tablet (100 mg total) by mouth daily.  Dispense: 90 tablet; Refill: 1  - Hemoglobin A1C; Future    2. Mixed hyperlipidemia  Fasting labs ordered for 6 months from now.  - Comp Metabolic Panel (14); Future  - Lipid Panel; Future    3. Visit for screening mammogram  Screening mammogram ordered.  Patient will then have her MRI of her breast completed.  - [facility] CHELSEY 2D+3D SCREENING BILAT (CPT=77067/14659); Future    4. Dysfunction of both eustachian tubes  Flonase prescribed to be used daily.  2 sprays in each nare.  - fluticasone propionate 50 MCG/ACT Nasal Suspension; 2 sprays by Each Nare route daily.  Dispense: 1 each; Refill: 1    Return to clinic for yearly physical.  Meds & Refills for this Visit:  Requested Prescriptions     Signed Prescriptions Disp Refills    SITagliptin Phosphate (JANUVIA) 100 MG Oral Tab 90 tablet 1     Sig: Take 1 tablet (100 mg total) by mouth daily.    fluticasone propionate 50 MCG/ACT Nasal Suspension 1 each 1     Si sprays by Each Nare route daily.       Health Maintenance:  Health Maintenance Due    Topic Date Due    Annual Well Visit  2025    COVID-19 Vaccine ( season) 2025    Diabetes Care Foot Exam  06/10/2025    Mammogram  2025       Patient/Caregiver Education: Patient/Caregiver Education: There are no barriers to learning. Medical education done.   Outcome: Patient verbalizes understanding. Patient is notified to call with any questions, complications, allergies, or worsening or changing symptoms.  Patient is to call with any side effects or complications from the treatments as a result of today.     Problem List:  Problem List[7]    Deysi White, TIFFANIE    Please note that portions of this note may have been completed with a voice recognition program. Efforts were made to edit the dictations but occasionally words are mis-transcribed.         [1]   Past Medical History:   Breast CA (HCC)    Bronchitis with bronchospasm    Cancer (HCC)    Diabetes (HCC)    Diabetes mellitus (HCC)    Essential hypertension    Frequent urination    Hemorrhoids    High cholesterol    History of mammogram    normal    Hyperlipidemia    Leukocytoclastic vasculitis (HCC)    Malignant neoplasm of female breast (HCC)   [2]   Past Surgical History:  Procedure Laterality Date    Cataract  right eye 2019    Chemotherapy  2002    Colonoscopy  2014    Lumpectomy left  2002    left breast      ,    Other surgical history  left breast lumpectomy, axillary lymph node ,    carpal tunnel both hands 2007    Radiation left  2002    Revise median n/carpal tunnel surg  2007    neuroplasty both    [3]   Social History  Socioeconomic History    Marital status:    Tobacco Use    Smoking status: Never    Smokeless tobacco: Never    Tobacco comments:     never smoked   Vaping Use    Vaping status: Never Used   Substance and Sexual Activity    Alcohol use: Yes     Alcohol/week: 2.0 standard drinks of alcohol     Types: 2 Glasses of wine per week     Comment: 2 glasses of  wine per week    Drug use: Never    Sexual activity: Yes   Other Topics Concern    Caffeine Concern No    Stress Concern No    Weight Concern No    Special Diet No    Exercise No    Seat Belt No   [4]   Family History  Problem Relation Age of Onset    Cancer Father 52        lung cancer   of pulm emboli    Cancer Mother         glioblastoma of brain      Breast Cancer Self 52    Ovarian Cancer Sister 46    Cancer Sister         ovarian cancer      Cancer Sister         cancer of uterine lining    Uterine Cancer Sister    [5] No Known Allergies  [6]   Current Outpatient Medications   Medication Sig Dispense Refill    SITagliptin Phosphate (JANUVIA) 100 MG Oral Tab Take 1 tablet (100 mg total) by mouth daily. 90 tablet 1    fluticasone propionate 50 MCG/ACT Nasal Suspension 2 sprays by Each Nare route daily. 1 each 1    METFORMIN  MG Oral Tablet 24 Hr TAKE 2 TABLETS TWICE A DAY WITH MEALS 360 tablet 3    ATORVASTATIN 10 MG Oral Tab TAKE 1 TABLET NIGHTLY 90 tablet 3    lisinopril 10 MG Oral Tab Take 1 tablet (10 mg total) by mouth daily. 90 tablet 3    Meloxicam 7.5 MG Oral Tab Take 1 tablet (7.5 mg total) by mouth daily. 15 tablet 1    clobetasol 0.05 % External Solution APPLY TOPICALLY TO AFFECTED AREA(S) NIGHTLY 50 mL 1    Glucose Blood In Vitro Strip TEST  DAILY E11.9 type 2  strip 3    Ascorbic Acid (VITAMIN C) 1000 MG Oral Tab Take 1 tablet (1,000 mg total) by mouth daily.      Cranberry 250 MG Oral Cap Take by mouth.      Aspirin 81 MG Oral Tab Take 1 tablet (81 mg total) by mouth daily.      Omega-3 Fatty Acids (FISH OIL) 1000 MG Oral Cap Take 1,000 mg by mouth daily.     [7]   Patient Active Problem List  Diagnosis    Type 2 diabetes mellitus (HCC)    Hyperlipidemia    Benign essential hypertension    Intrinsic eczema    Combined forms of age-related cataract, left eye    Diverticulosis of colon    Epiretinal membrane    Stage 3a chronic kidney disease (HCC)    S/P laser  cataract surgery, right    History of recurrent UTIs    History of left breast cancer    Dyslipidemia due to type 2 diabetes mellitus (HCC)    Special screening for malignant neoplasm of colon    Benign neoplasm of transverse colon

## 2025-08-04 ENCOUNTER — HOSPITAL ENCOUNTER (OUTPATIENT)
Dept: MAMMOGRAPHY | Age: 75
Discharge: HOME | End: 2025-08-04
Attending: STUDENT IN AN ORGANIZED HEALTH CARE EDUCATION/TRAINING PROGRAM

## 2025-08-04 DIAGNOSIS — Z12.31 VISIT FOR SCREENING MAMMOGRAM: ICD-10-CM

## 2025-08-04 PROCEDURE — 77063 BREAST TOMOSYNTHESIS BI: CPT

## 2025-08-04 PROCEDURE — 77067 SCR MAMMO BI INCL CAD: CPT

## (undated) NOTE — LETTER
08/30/21        [address] [facility]      Dear Urbano Rondon records indicate that you have outstanding lab work and or testing that was ordered for you and has not yet been completed:  Orders Placed This Encou

## (undated) NOTE — MR AVS SNAPSHOT
[facility] [address], [address] [id number] [phone]               Thank you for choosing us for your health care visit with Sabino Serrano MD.  We are glad to serve you and happy to provide you with this santamaria omega-3 fatty acids 1000 MG Caps   Take 1 Cap by mouth 2 (two) times daily.    Commonly known as:  FISH OIL                Where to Get Your Medications      These medications were sent to [facility] [address] [facility], [facility] [place] [place] [address] [facility] Educational Information     Healthy Diet and Regular Exercise  [employer] [facility] [employer] [address] for making healthy food choices  -   Enjoy your food, but eat less. Fully enjoy your food when eating. Don’t eat while distracted and slow down.    Avoid